# Patient Record
Sex: MALE | Race: WHITE | Employment: FULL TIME | URBAN - METROPOLITAN AREA
[De-identification: names, ages, dates, MRNs, and addresses within clinical notes are randomized per-mention and may not be internally consistent; named-entity substitution may affect disease eponyms.]

---

## 2019-07-27 ENCOUNTER — HOSPITAL ENCOUNTER (EMERGENCY)
Facility: HOSPITAL | Age: 34
Discharge: HOME | End: 2019-07-27
Attending: EMERGENCY MEDICINE
Payer: MEDICAID

## 2019-07-27 ENCOUNTER — APPOINTMENT (OUTPATIENT)
Dept: CT IMAGING | Facility: HOSPITAL | Age: 34
End: 2019-07-27
Attending: EMERGENCY MEDICINE
Payer: MEDICAID

## 2019-07-27 VITALS
BODY MASS INDEX: 21.7 KG/M2 | RESPIRATION RATE: 16 BRPM | HEIGHT: 71 IN | HEART RATE: 65 BPM | TEMPERATURE: 98 F | OXYGEN SATURATION: 100 % | SYSTOLIC BLOOD PRESSURE: 105 MMHG | WEIGHT: 155 LBS | DIASTOLIC BLOOD PRESSURE: 74 MMHG

## 2019-07-27 DIAGNOSIS — R10.31 ABDOMINAL PAIN, RIGHT LOWER QUADRANT: Primary | ICD-10-CM

## 2019-07-27 LAB
ALBUMIN SERPL-MCNC: 4.2 G/DL (ref 3.4–5)
ALBUMIN/GLOB SERPL: 1.2 {RATIO} (ref 1–2)
ALP LIVER SERPL-CCNC: 58 U/L (ref 45–117)
ALT SERPL-CCNC: 19 U/L (ref 16–61)
ANION GAP SERPL CALC-SCNC: 7 MMOL/L (ref 0–18)
AST SERPL-CCNC: 19 U/L (ref 15–37)
BASOPHILS # BLD AUTO: 0.02 X10(3) UL (ref 0–0.2)
BASOPHILS NFR BLD AUTO: 0.2 %
BILIRUB SERPL-MCNC: 0.7 MG/DL (ref 0.1–2)
BILIRUB UR QL STRIP.AUTO: NEGATIVE
BUN BLD-MCNC: 12 MG/DL (ref 7–18)
BUN/CREAT SERPL: 11.7 (ref 10–20)
CALCIUM BLD-MCNC: 9.1 MG/DL (ref 8.5–10.1)
CHLORIDE SERPL-SCNC: 108 MMOL/L (ref 98–112)
CLARITY UR REFRACT.AUTO: CLEAR
CO2 SERPL-SCNC: 25 MMOL/L (ref 21–32)
COLOR UR AUTO: YELLOW
CREAT BLD-MCNC: 1.03 MG/DL (ref 0.7–1.3)
DEPRECATED RDW RBC AUTO: 40.8 FL (ref 35.1–46.3)
EOSINOPHIL # BLD AUTO: 0.2 X10(3) UL (ref 0–0.7)
EOSINOPHIL NFR BLD AUTO: 2.3 %
ERYTHROCYTE [DISTWIDTH] IN BLOOD BY AUTOMATED COUNT: 12 % (ref 11–15)
GLOBULIN PLAS-MCNC: 3.6 G/DL (ref 2.8–4.4)
GLUCOSE BLD-MCNC: 85 MG/DL (ref 70–99)
GLUCOSE UR STRIP.AUTO-MCNC: NEGATIVE MG/DL
HCT VFR BLD AUTO: 40.6 % (ref 39–53)
HGB BLD-MCNC: 14.1 G/DL (ref 13–17.5)
IMM GRANULOCYTES # BLD AUTO: 0.01 X10(3) UL (ref 0–1)
IMM GRANULOCYTES NFR BLD: 0.1 %
KETONES UR STRIP.AUTO-MCNC: NEGATIVE MG/DL
LEUKOCYTE ESTERASE UR QL STRIP.AUTO: NEGATIVE
LYMPHOCYTES # BLD AUTO: 3.72 X10(3) UL (ref 1–4)
LYMPHOCYTES NFR BLD AUTO: 42.1 %
M PROTEIN MFR SERPL ELPH: 7.8 G/DL (ref 6.4–8.2)
MCH RBC QN AUTO: 32 PG (ref 26–34)
MCHC RBC AUTO-ENTMCNC: 34.7 G/DL (ref 31–37)
MCV RBC AUTO: 92.3 FL (ref 80–100)
MONOCYTES # BLD AUTO: 0.51 X10(3) UL (ref 0.1–1)
MONOCYTES NFR BLD AUTO: 5.8 %
NEUTROPHILS # BLD AUTO: 4.38 X10 (3) UL (ref 1.5–7.7)
NEUTROPHILS # BLD AUTO: 4.38 X10(3) UL (ref 1.5–7.7)
NEUTROPHILS NFR BLD AUTO: 49.5 %
NITRITE UR QL STRIP.AUTO: NEGATIVE
OSMOLALITY SERPL CALC.SUM OF ELEC: 289 MOSM/KG (ref 275–295)
PH UR STRIP.AUTO: 6 [PH] (ref 4.5–8)
PLATELET # BLD AUTO: 170 10(3)UL (ref 150–450)
POTASSIUM SERPL-SCNC: 3.8 MMOL/L (ref 3.5–5.1)
PROT UR STRIP.AUTO-MCNC: NEGATIVE MG/DL
RBC # BLD AUTO: 4.4 X10(6)UL (ref 4.3–5.7)
RBC UR QL AUTO: NEGATIVE
SODIUM SERPL-SCNC: 140 MMOL/L (ref 136–145)
SP GR UR STRIP.AUTO: 1.02 (ref 1–1.03)
UROBILINOGEN UR STRIP.AUTO-MCNC: 2 MG/DL
WBC # BLD AUTO: 8.8 X10(3) UL (ref 4–11)

## 2019-07-27 PROCEDURE — 74176 CT ABD & PELVIS W/O CONTRAST: CPT | Performed by: EMERGENCY MEDICINE

## 2019-07-27 PROCEDURE — 87591 N.GONORRHOEAE DNA AMP PROB: CPT | Performed by: EMERGENCY MEDICINE

## 2019-07-27 PROCEDURE — 85025 COMPLETE CBC W/AUTO DIFF WBC: CPT | Performed by: EMERGENCY MEDICINE

## 2019-07-27 PROCEDURE — 99284 EMERGENCY DEPT VISIT MOD MDM: CPT

## 2019-07-27 PROCEDURE — 87491 CHLMYD TRACH DNA AMP PROBE: CPT | Performed by: EMERGENCY MEDICINE

## 2019-07-27 PROCEDURE — 96361 HYDRATE IV INFUSION ADD-ON: CPT

## 2019-07-27 PROCEDURE — 96374 THER/PROPH/DIAG INJ IV PUSH: CPT

## 2019-07-27 PROCEDURE — 81003 URINALYSIS AUTO W/O SCOPE: CPT | Performed by: EMERGENCY MEDICINE

## 2019-07-27 PROCEDURE — 80053 COMPREHEN METABOLIC PANEL: CPT | Performed by: EMERGENCY MEDICINE

## 2019-07-27 RX ORDER — KETOROLAC TROMETHAMINE 30 MG/ML
15 INJECTION, SOLUTION INTRAMUSCULAR; INTRAVENOUS ONCE
Status: COMPLETED | OUTPATIENT
Start: 2019-07-27 | End: 2019-07-27

## 2019-07-27 RX ORDER — FEXOFENADINE HCL 180 MG/1
180 TABLET ORAL DAILY
COMMUNITY
End: 2021-08-30

## 2019-07-27 NOTE — ED PROVIDER NOTES
Patient Seen in: [facility] Emergency Department    History   Patient presents with:  Abdomen/Flank Pain (GI/)    Stated Complaint: lower abdominal pain x 2 months, getting worse    HPI    27-year-old male presents to the emergency department comp above.    Physical Exam     ED Triage Vitals [07/27/19 1426]   /73   Pulse 74   Resp 18   Temp 98.2 °F (36.8 °C)   Temp src Temporal   SpO2 96 %   O2 Device None (Room air)       Current:/74   Pulse 65   Temp 98.1 °F (36.7 °C)   Resp 16   Ht 18 intra-abdominal or pelvic process noted. 2. No bowel wall thickening or dilatation. 3. Incidental small calcified granuloma and scarring within the right lung base anteriorly, appearing without interval change. Dictated by:  Cristina Pacheco DO on 7/27/2019

## 2019-07-27 NOTE — ED INITIAL ASSESSMENT (HPI)
Pt c/o lower right quadrant pain with nausea x2 months - reports it has been increasing in consistency.  Also reports his urine has been dark and he has been having \"discomfort\" at the tip of his penis

## 2019-07-28 LAB
C TRACH DNA SPEC QL NAA+PROBE: NEGATIVE
N GONORRHOEA DNA SPEC QL NAA+PROBE: NEGATIVE

## 2019-08-22 ENCOUNTER — APPOINTMENT (OUTPATIENT)
Dept: GENERAL RADIOLOGY | Facility: HOSPITAL | Age: 34
End: 2019-08-22
Attending: EMERGENCY MEDICINE
Payer: MEDICAID

## 2019-08-22 ENCOUNTER — HOSPITAL ENCOUNTER (EMERGENCY)
Facility: HOSPITAL | Age: 34
Discharge: HOME | End: 2019-08-22
Attending: EMERGENCY MEDICINE
Payer: MEDICAID

## 2019-08-22 VITALS
BODY MASS INDEX: 21.7 KG/M2 | DIASTOLIC BLOOD PRESSURE: 68 MMHG | TEMPERATURE: 99 F | OXYGEN SATURATION: 99 % | SYSTOLIC BLOOD PRESSURE: 102 MMHG | HEIGHT: 71 IN | WEIGHT: 155 LBS | RESPIRATION RATE: 8 BRPM | HEART RATE: 63 BPM

## 2019-08-22 DIAGNOSIS — R07.89 CHEST PAIN, ATYPICAL: Primary | ICD-10-CM

## 2019-08-22 LAB
ALBUMIN SERPL-MCNC: 4.2 G/DL (ref 3.4–5)
ALBUMIN/GLOB SERPL: 1.3 {RATIO} (ref 1–2)
ALP LIVER SERPL-CCNC: 57 U/L (ref 45–117)
ALT SERPL-CCNC: 16 U/L (ref 16–61)
ANION GAP SERPL CALC-SCNC: 7 MMOL/L (ref 0–18)
AST SERPL-CCNC: 15 U/L (ref 15–37)
BASOPHILS # BLD AUTO: 0.04 X10(3) UL (ref 0–0.2)
BASOPHILS NFR BLD AUTO: 0.5 %
BILIRUB SERPL-MCNC: 0.6 MG/DL (ref 0.1–2)
BUN BLD-MCNC: 13 MG/DL (ref 7–18)
BUN/CREAT SERPL: 14.4 (ref 10–20)
CALCIUM BLD-MCNC: 8.9 MG/DL (ref 8.5–10.1)
CHLORIDE SERPL-SCNC: 107 MMOL/L (ref 98–112)
CO2 SERPL-SCNC: 26 MMOL/L (ref 21–32)
CREAT BLD-MCNC: 0.9 MG/DL (ref 0.7–1.3)
D-DIMER: <0.27 UG/ML FEU (ref ?–0.5)
DEPRECATED RDW RBC AUTO: 41.9 FL (ref 35.1–46.3)
EOSINOPHIL # BLD AUTO: 0.15 X10(3) UL (ref 0–0.7)
EOSINOPHIL NFR BLD AUTO: 1.8 %
ERYTHROCYTE [DISTWIDTH] IN BLOOD BY AUTOMATED COUNT: 12.1 % (ref 11–15)
GLOBULIN PLAS-MCNC: 3.3 G/DL (ref 2.8–4.4)
GLUCOSE BLD-MCNC: 82 MG/DL (ref 70–99)
HCT VFR BLD AUTO: 40.2 % (ref 39–53)
HGB BLD-MCNC: 13.8 G/DL (ref 13–17.5)
IMM GRANULOCYTES # BLD AUTO: 0.02 X10(3) UL (ref 0–1)
IMM GRANULOCYTES NFR BLD: 0.2 %
LYMPHOCYTES # BLD AUTO: 3.27 X10(3) UL (ref 1–4)
LYMPHOCYTES NFR BLD AUTO: 38.8 %
M PROTEIN MFR SERPL ELPH: 7.5 G/DL (ref 6.4–8.2)
MCH RBC QN AUTO: 32.2 PG (ref 26–34)
MCHC RBC AUTO-ENTMCNC: 34.3 G/DL (ref 31–37)
MCV RBC AUTO: 93.7 FL (ref 80–100)
MONOCYTES # BLD AUTO: 0.57 X10(3) UL (ref 0.1–1)
MONOCYTES NFR BLD AUTO: 6.8 %
NEUTROPHILS # BLD AUTO: 4.38 X10 (3) UL (ref 1.5–7.7)
NEUTROPHILS # BLD AUTO: 4.38 X10(3) UL (ref 1.5–7.7)
NEUTROPHILS NFR BLD AUTO: 51.9 %
OSMOLALITY SERPL CALC.SUM OF ELEC: 289 MOSM/KG (ref 275–295)
PLATELET # BLD AUTO: 180 10(3)UL (ref 150–450)
POTASSIUM SERPL-SCNC: 3.8 MMOL/L (ref 3.5–5.1)
RBC # BLD AUTO: 4.29 X10(6)UL (ref 4.3–5.7)
SODIUM SERPL-SCNC: 140 MMOL/L (ref 136–145)
TROPONIN I SERPL-MCNC: <0.045 NG/ML (ref ?–0.04)
WBC # BLD AUTO: 8.4 X10(3) UL (ref 4–11)

## 2019-08-22 PROCEDURE — 93005 ELECTROCARDIOGRAM TRACING: CPT

## 2019-08-22 PROCEDURE — 71045 X-RAY EXAM CHEST 1 VIEW: CPT | Performed by: EMERGENCY MEDICINE

## 2019-08-22 PROCEDURE — 85025 COMPLETE CBC W/AUTO DIFF WBC: CPT | Performed by: EMERGENCY MEDICINE

## 2019-08-22 PROCEDURE — 84484 ASSAY OF TROPONIN QUANT: CPT | Performed by: EMERGENCY MEDICINE

## 2019-08-22 PROCEDURE — 93010 ELECTROCARDIOGRAM REPORT: CPT

## 2019-08-22 PROCEDURE — 99284 EMERGENCY DEPT VISIT MOD MDM: CPT

## 2019-08-22 PROCEDURE — 80053 COMPREHEN METABOLIC PANEL: CPT | Performed by: EMERGENCY MEDICINE

## 2019-08-22 PROCEDURE — 85379 FIBRIN DEGRADATION QUANT: CPT | Performed by: EMERGENCY MEDICINE

## 2019-08-22 PROCEDURE — 99285 EMERGENCY DEPT VISIT HI MDM: CPT

## 2019-08-22 PROCEDURE — 36415 COLL VENOUS BLD VENIPUNCTURE: CPT

## 2019-08-22 RX ORDER — HYDROCODONE BITARTRATE AND ACETAMINOPHEN 5; 325 MG/1; MG/1
1-2 TABLET ORAL EVERY 6 HOURS PRN
Qty: 10 TABLET | Refills: 0 | Status: SHIPPED | OUTPATIENT
Start: 2019-08-22 | End: 2019-08-29

## 2019-08-22 NOTE — ED PROVIDER NOTES
Patient Seen in: [facility] Emergency Department    History   Patient presents with:  Chest Pain Angina (cardiovascular)    Stated Complaint:     HPI    Patient is a 14-year-old male who reports he has a history of myocardial bridging with a \"70% b BMI 21.62 kg/m²         Physical Exam   Constitutional: He is oriented to person, place, and time. He appears well-developed and well-nourished. HENT:   Head: Normocephalic and atraumatic.    Mouth/Throat: Oropharynx is clear and moist.   Eyes: Pupils ar noted on EKG Report. Rate: 78  Rhythm: Sinus Rhythm  Reading: No ST segment or T wave changes. No old available for comparison. Ct Abdomen+pelvis Kidneystone 2d Rndr(no Iv,no Oral)(cpt=74176)    Result Date: 7/27/2019  CONCLUSION:  1.  No acu Bryanna Alea 6.  [phone]    In 3 days          Medications Prescribed:  Current Discharge Medication List    START taking these medications    HYDROcodone-acetaminophen 5-325 MG Oral Tab  Take 1-2 tablets by mouth every 6 (six) hours as neede

## 2019-08-22 NOTE — ED INITIAL ASSESSMENT (HPI)
PT c/o chest pain starting around 0600, woke PT out of sleep. PT states he \"felt like I was gonna pass out while on the highway\" yesterday and Sunday. PT c/o SOB with chest pain. Also c/o leg and foot pain.

## 2019-08-23 LAB
ATRIAL RATE: 78 BPM
P AXIS: 69 DEGREES
P-R INTERVAL: 142 MS
Q-T INTERVAL: 376 MS
QRS DURATION: 78 MS
QTC CALCULATION (BEZET): 428 MS
R AXIS: 12 DEGREES
T AXIS: 56 DEGREES
VENTRICULAR RATE: 78 BPM

## 2019-09-04 ENCOUNTER — HOSPITAL ENCOUNTER (EMERGENCY)
Facility: HOSPITAL | Age: 34
Discharge: HOME | End: 2019-09-04
Attending: EMERGENCY MEDICINE
Payer: MEDICAID

## 2019-09-04 VITALS
OXYGEN SATURATION: 98 % | HEART RATE: 87 BPM | RESPIRATION RATE: 20 BRPM | TEMPERATURE: 99 F | DIASTOLIC BLOOD PRESSURE: 70 MMHG | SYSTOLIC BLOOD PRESSURE: 119 MMHG

## 2019-09-04 DIAGNOSIS — K04.7 DENTAL INFECTION: Primary | ICD-10-CM

## 2019-09-04 LAB
ANION GAP SERPL CALC-SCNC: 9 MMOL/L (ref 0–18)
BASOPHILS # BLD AUTO: 0.02 X10(3) UL (ref 0–0.2)
BASOPHILS NFR BLD AUTO: 0.2 %
BUN BLD-MCNC: 6 MG/DL (ref 7–18)
BUN/CREAT SERPL: 7.5 (ref 10–20)
CALCIUM BLD-MCNC: 8.8 MG/DL (ref 8.5–10.1)
CHLORIDE SERPL-SCNC: 105 MMOL/L (ref 98–112)
CO2 SERPL-SCNC: 25 MMOL/L (ref 21–32)
CREAT BLD-MCNC: 0.8 MG/DL (ref 0.7–1.3)
DEPRECATED RDW RBC AUTO: 41.2 FL (ref 35.1–46.3)
EOSINOPHIL # BLD AUTO: 0.12 X10(3) UL (ref 0–0.7)
EOSINOPHIL NFR BLD AUTO: 1.1 %
ERYTHROCYTE [DISTWIDTH] IN BLOOD BY AUTOMATED COUNT: 11.9 % (ref 11–15)
GLUCOSE BLD-MCNC: 89 MG/DL (ref 70–99)
HCT VFR BLD AUTO: 40 % (ref 39–53)
HGB BLD-MCNC: 13.4 G/DL (ref 13–17.5)
IMM GRANULOCYTES # BLD AUTO: 0.05 X10(3) UL (ref 0–1)
IMM GRANULOCYTES NFR BLD: 0.5 %
LYMPHOCYTES # BLD AUTO: 2.18 X10(3) UL (ref 1–4)
LYMPHOCYTES NFR BLD AUTO: 19.9 %
MCH RBC QN AUTO: 31.6 PG (ref 26–34)
MCHC RBC AUTO-ENTMCNC: 33.5 G/DL (ref 31–37)
MCV RBC AUTO: 94.3 FL (ref 80–100)
MONOCYTES # BLD AUTO: 0.74 X10(3) UL (ref 0.1–1)
MONOCYTES NFR BLD AUTO: 6.8 %
NEUTROPHILS # BLD AUTO: 7.84 X10 (3) UL (ref 1.5–7.7)
NEUTROPHILS # BLD AUTO: 7.84 X10(3) UL (ref 1.5–7.7)
NEUTROPHILS NFR BLD AUTO: 71.5 %
OSMOLALITY SERPL CALC.SUM OF ELEC: 285 MOSM/KG (ref 275–295)
PLATELET # BLD AUTO: 159 10(3)UL (ref 150–450)
POTASSIUM SERPL-SCNC: 3.6 MMOL/L (ref 3.5–5.1)
RBC # BLD AUTO: 4.24 X10(6)UL (ref 4.3–5.7)
SODIUM SERPL-SCNC: 139 MMOL/L (ref 136–145)
WBC # BLD AUTO: 11 X10(3) UL (ref 4–11)

## 2019-09-04 PROCEDURE — 99284 EMERGENCY DEPT VISIT MOD MDM: CPT

## 2019-09-04 PROCEDURE — 96365 THER/PROPH/DIAG IV INF INIT: CPT

## 2019-09-04 PROCEDURE — 85025 COMPLETE CBC W/AUTO DIFF WBC: CPT | Performed by: PHYSICIAN ASSISTANT

## 2019-09-04 PROCEDURE — 80048 BASIC METABOLIC PNL TOTAL CA: CPT | Performed by: PHYSICIAN ASSISTANT

## 2019-09-04 PROCEDURE — 96375 TX/PRO/DX INJ NEW DRUG ADDON: CPT

## 2019-09-04 RX ORDER — CLINDAMYCIN HYDROCHLORIDE 300 MG/1
600 CAPSULE ORAL 3 TIMES DAILY
Qty: 60 CAPSULE | Refills: 0 | Status: SHIPPED | OUTPATIENT
Start: 2019-09-04 | End: 2019-09-14

## 2019-09-04 RX ORDER — HYDROCODONE BITARTRATE AND ACETAMINOPHEN 5; 325 MG/1; MG/1
1 TABLET ORAL EVERY 6 HOURS PRN
Qty: 10 TABLET | Refills: 0 | Status: SHIPPED | OUTPATIENT
Start: 2019-09-04 | End: 2019-09-04

## 2019-09-04 RX ORDER — HYDROCODONE BITARTRATE AND ACETAMINOPHEN 5; 325 MG/1; MG/1
1 TABLET ORAL EVERY 6 HOURS PRN
Qty: 10 TABLET | Refills: 0 | Status: SHIPPED | OUTPATIENT
Start: 2019-09-04 | End: 2019-09-11

## 2019-09-04 RX ORDER — CLINDAMYCIN HYDROCHLORIDE 150 MG/1
CAPSULE ORAL 3 TIMES DAILY
COMMUNITY
End: 2021-08-30

## 2019-09-04 RX ORDER — KETOROLAC TROMETHAMINE 30 MG/ML
30 INJECTION, SOLUTION INTRAMUSCULAR; INTRAVENOUS ONCE
Status: COMPLETED | OUTPATIENT
Start: 2019-09-04 | End: 2019-09-04

## 2019-09-04 NOTE — ED INITIAL ASSESSMENT (HPI)
Pt here with right sided mouth pain. Pt started clindamycin, total 4 doses since yesterday. Pt not feeling better.

## 2019-09-04 NOTE — ED PROVIDER NOTES
Patient Seen in: [facility] Emergency Department    History   Patient presents with:  Dental Problem (dental)    Stated Complaint: dental problem, seen by dentist yesterday    HPI    Patient is a 14-year-old male.   Medical history of anxiety, myocar mandibular line swelling. Moderate right periodontal inflammation with minimal fluctuance. Very poor dentition. Multiple caries. Anterior neck is spared.   Lung: No distress, RR, no retraction, breath sounds are clear bilaterally  Cardio: Sinus tachycar Short course of pain management. Vital signs improved. Pulse of 87. Patient feels much improved. Discharge.       Disposition and Plan     Clinical Impression:  Dental infection  (primary encounter diagnosis)    Disposition:  There is no disposition o

## 2019-09-04 NOTE — ED NOTES
Patient reports he was seen by a dental care clinic for right dental pain yesterday. Was told he had an infection, started on antibiotics yesterday and today the swelling and pain is now worse.   Feels like the swelling is now under his jaw and in his thro

## 2020-01-01 ENCOUNTER — EXTERNAL RECORD (OUTPATIENT)
Dept: CARDIOLOGY | Age: 35
End: 2020-01-01

## 2020-04-28 ENCOUNTER — APPOINTMENT (OUTPATIENT)
Dept: GENERAL RADIOLOGY | Facility: HOSPITAL | Age: 35
End: 2020-04-28
Attending: NURSE PRACTITIONER
Payer: MEDICAID

## 2020-04-28 ENCOUNTER — HOSPITAL ENCOUNTER (EMERGENCY)
Facility: HOSPITAL | Age: 35
Discharge: HOME | End: 2020-04-28
Attending: EMERGENCY MEDICINE
Payer: MEDICAID

## 2020-04-28 VITALS
BODY MASS INDEX: 21.7 KG/M2 | SYSTOLIC BLOOD PRESSURE: 118 MMHG | TEMPERATURE: 99 F | OXYGEN SATURATION: 98 % | HEART RATE: 94 BPM | HEIGHT: 71 IN | RESPIRATION RATE: 16 BRPM | DIASTOLIC BLOOD PRESSURE: 75 MMHG | WEIGHT: 155 LBS

## 2020-04-28 DIAGNOSIS — B34.9 VIRAL SYNDROME: Primary | ICD-10-CM

## 2020-04-28 PROCEDURE — 71045 X-RAY EXAM CHEST 1 VIEW: CPT | Performed by: NURSE PRACTITIONER

## 2020-04-28 PROCEDURE — 99283 EMERGENCY DEPT VISIT LOW MDM: CPT

## 2020-04-28 NOTE — ED PROVIDER NOTES
Patient Seen in: [facility] Emergency Department      History   Patient presents with:  Fever  Cough/URI    Stated Complaint: fever/chills/body aches ~ (101.7tmax)     39-year-old male presents today with complaints of fever, body aches joint pain a Temp 98.6 °F (37 °C) (Oral)   Resp 16   Ht 180.3 cm (5' 11\")   Wt 70.3 kg   SpO2 98%   BMI 21.62 kg/m²         Physical Exam  Vitals signs and nursing note reviewed. Constitutional:       Appearance: He is well-developed.    HENT:      Head: Normocephali with a slight cough any URI symptoms. Patient also complaining of headache. Symptom started last night. Did take TheraFlu which he did feel better. Was afebrile upon arrival here. Patient without any respiratory distress. Rapid COVID was negative.   Jan Delcid

## 2020-04-28 NOTE — ED INITIAL ASSESSMENT (HPI)
Pt is ambulatory to room, reports symptoms started last night with fever tmax 101.7, chills, bodyaches, dry cough, and headache. 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 Last took Theraflu at 10pm last night.

## 2020-07-14 ENCOUNTER — TELEPHONE (OUTPATIENT)
Dept: CARDIOLOGY | Age: 35
End: 2020-07-14

## 2020-07-21 ENCOUNTER — TELEPHONE (OUTPATIENT)
Dept: CARDIOLOGY | Age: 35
End: 2020-07-21

## 2020-07-21 ENCOUNTER — OFFICE VISIT (OUTPATIENT)
Dept: CARDIOLOGY | Age: 35
End: 2020-07-21

## 2020-07-21 VITALS
WEIGHT: 151 LBS | BODY MASS INDEX: 21.14 KG/M2 | HEART RATE: 64 BPM | DIASTOLIC BLOOD PRESSURE: 58 MMHG | SYSTOLIC BLOOD PRESSURE: 110 MMHG | HEIGHT: 71 IN

## 2020-07-21 DIAGNOSIS — R06.02 SOB (SHORTNESS OF BREATH): ICD-10-CM

## 2020-07-21 DIAGNOSIS — R00.2 PALPITATIONS: ICD-10-CM

## 2020-07-21 DIAGNOSIS — R07.9 CHEST PAIN, UNSPECIFIED TYPE: Primary | ICD-10-CM

## 2020-07-21 DIAGNOSIS — Q24.5 MYOCARDIAL BRIDGE: ICD-10-CM

## 2020-07-21 PROCEDURE — 99245 OFF/OP CONSLTJ NEW/EST HI 55: CPT | Performed by: INTERNAL MEDICINE

## 2020-07-21 ASSESSMENT — PATIENT HEALTH QUESTIONNAIRE - PHQ9
CLINICAL INTERPRETATION OF PHQ2 SCORE: NO FURTHER SCREENING NEEDED
CLINICAL INTERPRETATION OF PHQ9 SCORE: NO FURTHER SCREENING NEEDED
1. LITTLE INTEREST OR PLEASURE IN DOING THINGS: NOT AT ALL
SUM OF ALL RESPONSES TO PHQ9 QUESTIONS 1 AND 2: 0
2. FEELING DOWN, DEPRESSED OR HOPELESS: NOT AT ALL
SUM OF ALL RESPONSES TO PHQ9 QUESTIONS 1 AND 2: 0

## 2020-07-21 ASSESSMENT — ENCOUNTER SYMPTOMS
FEVER: 0
COUGH: 0
HEMATOCHEZIA: 0
ALLERGIC/IMMUNOLOGIC COMMENTS: NO NEW FOOD ALLERGIES
BRUISES/BLEEDS EASILY: 0
HEMOPTYSIS: 0
WEIGHT GAIN: 0
WEIGHT LOSS: 0
CHILLS: 0
SUSPICIOUS LESIONS: 0

## 2020-07-24 ENCOUNTER — ORDER TRANSCRIPTION (OUTPATIENT)
Dept: ADMINISTRATIVE | Facility: HOSPITAL | Age: 35
End: 2020-07-24

## 2020-07-24 DIAGNOSIS — R06.02 SOB (SHORTNESS OF BREATH): Primary | ICD-10-CM

## 2020-07-24 DIAGNOSIS — R07.9 CHEST PAIN, UNSPECIFIED TYPE: ICD-10-CM

## 2020-11-13 ENCOUNTER — HOSPITAL ENCOUNTER (EMERGENCY)
Facility: HOSPITAL | Age: 35
Discharge: HOME | End: 2020-11-13
Attending: EMERGENCY MEDICINE
Payer: MEDICAID

## 2020-11-13 VITALS
HEART RATE: 84 BPM | TEMPERATURE: 98 F | HEIGHT: 71 IN | SYSTOLIC BLOOD PRESSURE: 115 MMHG | DIASTOLIC BLOOD PRESSURE: 73 MMHG | WEIGHT: 160 LBS | BODY MASS INDEX: 22.4 KG/M2 | RESPIRATION RATE: 16 BRPM | OXYGEN SATURATION: 99 %

## 2020-11-13 DIAGNOSIS — B34.9 VIRAL SYNDROME: Primary | ICD-10-CM

## 2020-11-13 DIAGNOSIS — Z20.822 CLOSE EXPOSURE TO COVID-19 VIRUS: ICD-10-CM

## 2020-11-13 PROCEDURE — 87081 CULTURE SCREEN ONLY: CPT | Performed by: EMERGENCY MEDICINE

## 2020-11-13 PROCEDURE — 87147 CULTURE TYPE IMMUNOLOGIC: CPT | Performed by: EMERGENCY MEDICINE

## 2020-11-13 PROCEDURE — 87430 STREP A AG IA: CPT | Performed by: EMERGENCY MEDICINE

## 2020-11-13 PROCEDURE — 99283 EMERGENCY DEPT VISIT LOW MDM: CPT

## 2020-11-14 NOTE — ED PROVIDER NOTES
Patient Seen in: [facility] Emergency Department      History   Patient presents with:  Sore Throat  Cough/URI    Stated Complaint: sore throat, slight sob, close friend covid +    HPI    Patient is a 48-year-old who has close exposure to someone wi extremities. Normal capillary refill. SKIN: Well perfused, without cyanosis. No rashes. NEURO: Alert and appropriate with no focal neurologic deficits.     ED Course     Labs Reviewed   RAPID STREP A SCREEN (LC) - Normal   SARS-COV-2 BY PCR ()   GR

## 2020-11-23 ENCOUNTER — TELEPHONE (OUTPATIENT)
Dept: CARDIOLOGY | Age: 35
End: 2020-11-23

## 2021-09-25 ENCOUNTER — APPOINTMENT (OUTPATIENT)
Dept: CT IMAGING | Age: 36
End: 2021-09-25
Attending: EMERGENCY MEDICINE
Payer: MEDICAID

## 2021-09-25 ENCOUNTER — HOSPITAL ENCOUNTER (EMERGENCY)
Age: 36
Discharge: HOME | End: 2021-09-26
Attending: EMERGENCY MEDICINE
Payer: MEDICAID

## 2021-09-25 VITALS
WEIGHT: 139 LBS | HEART RATE: 67 BPM | BODY MASS INDEX: 19.46 KG/M2 | DIASTOLIC BLOOD PRESSURE: 79 MMHG | OXYGEN SATURATION: 98 % | SYSTOLIC BLOOD PRESSURE: 106 MMHG | HEIGHT: 71 IN | RESPIRATION RATE: 19 BRPM

## 2021-09-25 DIAGNOSIS — R63.0 ANOREXIA: ICD-10-CM

## 2021-09-25 DIAGNOSIS — R07.89 CHEST PAIN, ATYPICAL: ICD-10-CM

## 2021-09-25 DIAGNOSIS — R00.2 PALPITATIONS: ICD-10-CM

## 2021-09-25 DIAGNOSIS — R53.83 OTHER FATIGUE: Primary | ICD-10-CM

## 2021-09-25 LAB
ALBUMIN SERPL-MCNC: 4 G/DL (ref 3.4–5)
ALBUMIN/GLOB SERPL: 1.2 {RATIO} (ref 1–2)
ALP LIVER SERPL-CCNC: 49 U/L
ALT SERPL-CCNC: 21 U/L
ANION GAP SERPL CALC-SCNC: 5 MMOL/L (ref 0–18)
AST SERPL-CCNC: 13 U/L (ref 15–37)
BASOPHILS # BLD AUTO: 0.04 X10(3) UL (ref 0–0.2)
BASOPHILS NFR BLD AUTO: 0.5 %
BILIRUB SERPL-MCNC: 0.8 MG/DL (ref 0.1–2)
BUN BLD-MCNC: 10 MG/DL (ref 7–18)
CALCIUM BLD-MCNC: 9.3 MG/DL (ref 8.5–10.1)
CHLORIDE SERPL-SCNC: 104 MMOL/L (ref 98–112)
CK SERPL-CCNC: 96 U/L
CO2 SERPL-SCNC: 30 MMOL/L (ref 21–32)
CREAT BLD-MCNC: 0.98 MG/DL
EOSINOPHIL # BLD AUTO: 0.19 X10(3) UL (ref 0–0.7)
EOSINOPHIL NFR BLD AUTO: 2.3 %
ERYTHROCYTE [DISTWIDTH] IN BLOOD BY AUTOMATED COUNT: 12.1 %
GLOBULIN PLAS-MCNC: 3.3 G/DL (ref 2.8–4.4)
GLUCOSE BLD-MCNC: 97 MG/DL (ref 70–99)
HCT VFR BLD AUTO: 42.7 %
HGB BLD-MCNC: 14.3 G/DL
IMM GRANULOCYTES # BLD AUTO: 0.01 X10(3) UL (ref 0–1)
IMM GRANULOCYTES NFR BLD: 0.1 %
LIPASE SERPL-CCNC: 82 U/L (ref 73–393)
LYMPHOCYTES # BLD AUTO: 3.12 X10(3) UL (ref 1–4)
LYMPHOCYTES NFR BLD AUTO: 37 %
MCH RBC QN AUTO: 32.4 PG (ref 26–34)
MCHC RBC AUTO-ENTMCNC: 33.5 G/DL (ref 31–37)
MCV RBC AUTO: 96.6 FL
MONOCYTES # BLD AUTO: 0.55 X10(3) UL (ref 0.1–1)
MONOCYTES NFR BLD AUTO: 6.5 %
NEUTROPHILS # BLD AUTO: 4.52 X10 (3) UL (ref 1.5–7.7)
NEUTROPHILS # BLD AUTO: 4.52 X10(3) UL (ref 1.5–7.7)
NEUTROPHILS NFR BLD AUTO: 53.6 %
OSMOLALITY SERPL CALC.SUM OF ELEC: 287 MOSM/KG (ref 275–295)
PLATELET # BLD AUTO: 174 10(3)UL (ref 150–450)
POTASSIUM SERPL-SCNC: 3.8 MMOL/L (ref 3.5–5.1)
PROT SERPL-MCNC: 7.3 G/DL (ref 6.4–8.2)
RBC # BLD AUTO: 4.42 X10(6)UL
SODIUM SERPL-SCNC: 139 MMOL/L (ref 136–145)
TROPONIN I SERPL-MCNC: <0.045 NG/ML (ref ?–0.04)
WBC # BLD AUTO: 8.4 X10(3) UL (ref 4–11)

## 2021-09-25 PROCEDURE — 99285 EMERGENCY DEPT VISIT HI MDM: CPT

## 2021-09-25 PROCEDURE — 80053 COMPREHEN METABOLIC PANEL: CPT | Performed by: EMERGENCY MEDICINE

## 2021-09-25 PROCEDURE — 84484 ASSAY OF TROPONIN QUANT: CPT | Performed by: EMERGENCY MEDICINE

## 2021-09-25 PROCEDURE — 83690 ASSAY OF LIPASE: CPT

## 2021-09-25 PROCEDURE — 85025 COMPLETE CBC W/AUTO DIFF WBC: CPT

## 2021-09-25 PROCEDURE — 93005 ELECTROCARDIOGRAM TRACING: CPT

## 2021-09-25 PROCEDURE — 74177 CT ABD & PELVIS W/CONTRAST: CPT | Performed by: EMERGENCY MEDICINE

## 2021-09-25 PROCEDURE — 85025 COMPLETE CBC W/AUTO DIFF WBC: CPT | Performed by: EMERGENCY MEDICINE

## 2021-09-25 PROCEDURE — 84484 ASSAY OF TROPONIN QUANT: CPT

## 2021-09-25 PROCEDURE — 83690 ASSAY OF LIPASE: CPT | Performed by: EMERGENCY MEDICINE

## 2021-09-25 PROCEDURE — 80053 COMPREHEN METABOLIC PANEL: CPT

## 2021-09-25 PROCEDURE — 82550 ASSAY OF CK (CPK): CPT | Performed by: EMERGENCY MEDICINE

## 2021-09-25 PROCEDURE — 70450 CT HEAD/BRAIN W/O DYE: CPT | Performed by: EMERGENCY MEDICINE

## 2021-09-25 PROCEDURE — 71275 CT ANGIOGRAPHY CHEST: CPT | Performed by: EMERGENCY MEDICINE

## 2021-09-25 PROCEDURE — 96360 HYDRATION IV INFUSION INIT: CPT

## 2021-09-25 PROCEDURE — 99284 EMERGENCY DEPT VISIT MOD MDM: CPT

## 2021-09-25 PROCEDURE — 84443 ASSAY THYROID STIM HORMONE: CPT | Performed by: EMERGENCY MEDICINE

## 2021-09-25 PROCEDURE — 93010 ELECTROCARDIOGRAM REPORT: CPT

## 2021-09-26 LAB
ATRIAL RATE: 78 BPM
P AXIS: 19 DEGREES
P-R INTERVAL: 144 MS
Q-T INTERVAL: 380 MS
QRS DURATION: 82 MS
QTC CALCULATION (BEZET): 433 MS
R AXIS: 33 DEGREES
T AXIS: 57 DEGREES
TSI SER-ACNC: 0.91 MIU/ML (ref 0.36–3.74)
VENTRICULAR RATE: 78 BPM

## 2021-09-26 NOTE — ED INITIAL ASSESSMENT (HPI)
For past 2 weeks has had pressure in head, non productive cough, unintentional weight loss of 20 lbs in past few months, pressure in chest. Decreased appetite. Nausea but no emesis.  Reports occasional palpitations, increased fatigue and some SOB with exert

## 2021-09-26 NOTE — ED PROVIDER NOTES
Patient Seen in: THE [facility] Emergency Department In [place]      History   Patient presents with:  Difficulty Breathing  Abdomen/Flank Pain    Stated Complaint: some SOB;cough;left lower rib and LUQ abd pain for 3 wks    Subjective:   HPI    Patient for m are normal. Pupils are equal, round, and reactive to light. Neck: Normal range of motion. Neck supple. No JVD present. Cardiovascular: Normal rate and regular rhythm. Normal peripheral perfusion with good color.   No murmurs  Pulmonary/Chest: Normal reported anorexia and some left upper quadrant pain as well. I have personally visualized the Radiology studies and agree with interpretation from radiology.     CT BRAIN OR HEAD ([id number])    Result Date: 9/25/2021  PROCEDURE:  CT BRAIN OR HEAD (28152)  CO visualization of vascular anatomy. Dose reduction techniques were used. Dose information is transmitted to the ACR [facility] [employer] [facility]iology) NRDR ([address]) which includes the Dose Index Registry.   PATIENT STATED HISTORY:(As No free fluid. Copious amount stool throughout the colon. Dictated by (CST): Sunday Castanon MD on 9/25/2021 at 11:30 PM     Finalized by (CST): Sunday Castanon MD on 9/25/2021 at 11:44 PM         Basic labs are fine. Troponin negative. TSH is pending.

## 2021-10-22 ENCOUNTER — HOSPITAL ENCOUNTER (EMERGENCY)
Age: 36
Discharge: HOME | End: 2021-10-22
Attending: EMERGENCY MEDICINE
Payer: MEDICAID

## 2021-10-22 VITALS
DIASTOLIC BLOOD PRESSURE: 65 MMHG | OXYGEN SATURATION: 97 % | BODY MASS INDEX: 19.6 KG/M2 | HEIGHT: 71 IN | RESPIRATION RATE: 20 BRPM | WEIGHT: 140 LBS | SYSTOLIC BLOOD PRESSURE: 117 MMHG | TEMPERATURE: 98 F | HEART RATE: 82 BPM

## 2021-10-22 DIAGNOSIS — R19.7 NAUSEA VOMITING AND DIARRHEA: Primary | ICD-10-CM

## 2021-10-22 DIAGNOSIS — R11.2 NAUSEA VOMITING AND DIARRHEA: Primary | ICD-10-CM

## 2021-10-22 DIAGNOSIS — R10.9 ABDOMINAL PAIN OF UNKNOWN ETIOLOGY: ICD-10-CM

## 2021-10-22 PROCEDURE — 99283 EMERGENCY DEPT VISIT LOW MDM: CPT

## 2021-10-22 RX ORDER — DICYCLOMINE HCL 20 MG
20 TABLET ORAL 4 TIMES DAILY PRN
Qty: 30 TABLET | Refills: 0 | Status: SHIPPED | OUTPATIENT
Start: 2021-10-22 | End: 2021-11-21

## 2021-10-22 RX ORDER — ONDANSETRON 4 MG/1
4 TABLET, ORALLY DISINTEGRATING ORAL ONCE
Status: COMPLETED | OUTPATIENT
Start: 2021-10-22 | End: 2021-10-22

## 2021-10-22 RX ORDER — ONDANSETRON 4 MG/1
2 TABLET, ORALLY DISINTEGRATING ORAL EVERY 4 HOURS PRN
Qty: 10 TABLET | Refills: 0 | Status: SHIPPED | OUTPATIENT
Start: 2021-10-22 | End: 2021-10-29

## 2021-10-22 NOTE — ED PROVIDER NOTES
Patient Seen in: THE [facility] Emergency Department In [place]      History   Patient presents with:  Abdomen/Flank Pain    Stated Complaint: abd pain nvd    Subjective:   HPI    This is a 69-year-old male who states that him and his family members all have src Temporal   SpO2 98 %   O2 Device None (Room air)       Current:/65   Pulse 82   Temp 98.3 °F (36.8 °C) (Temporal)   Resp 20   Ht 180.3 cm (5' 11\")   Wt 63.5 kg   SpO2 97%   BMI 19.53 kg/m²         Physical Exam  General: Well-developed male no r Impression:  Nausea vomiting and diarrhea  (primary encounter diagnosis)  Abdominal pain of unknown etiology     Disposition:  Discharge  10/22/2021  7:13 pm    Follow-up:  Kaila De La Torre MD  [address]  [phone]

## 2021-11-21 ENCOUNTER — HOSPITAL ENCOUNTER (EMERGENCY)
Age: 36
Discharge: HOME | End: 2021-11-21
Payer: MEDICAID

## 2021-11-21 VITALS
TEMPERATURE: 98 F | WEIGHT: 140 LBS | DIASTOLIC BLOOD PRESSURE: 65 MMHG | BODY MASS INDEX: 20.73 KG/M2 | RESPIRATION RATE: 16 BRPM | HEIGHT: 69 IN | HEART RATE: 75 BPM | SYSTOLIC BLOOD PRESSURE: 115 MMHG | OXYGEN SATURATION: 98 %

## 2021-11-21 DIAGNOSIS — J06.9 VIRAL UPPER RESPIRATORY ILLNESS: Primary | ICD-10-CM

## 2021-11-21 PROCEDURE — 99283 EMERGENCY DEPT VISIT LOW MDM: CPT

## 2021-11-22 NOTE — ED PROVIDER NOTES
Patient Seen in: THE [facility] Emergency Department In [place]      History   Patient presents with:  Testing  Headache    Stated Complaint: Wife just tested positive for COVID so he requests testing.  He c/o a slight hea*    Subjective:   HPI    23-year-old motion, no thyromegaly or lymphadenopathy.   Eye examination: EOMs are intact, normal conjunctival  ENT: Low-grade audible nasal congestion  Lung: No distress, RR, no retraction, subtle expiratory wheeze to the left lower lobe  Cardio: Regular rate and rhyt

## 2021-11-30 ENCOUNTER — HOSPITAL ENCOUNTER (EMERGENCY)
Age: 36
Discharge: HOME | End: 2021-11-30
Payer: MEDICAID

## 2021-11-30 ENCOUNTER — APPOINTMENT (OUTPATIENT)
Dept: GENERAL RADIOLOGY | Age: 36
End: 2021-11-30
Payer: MEDICAID

## 2021-11-30 VITALS
SYSTOLIC BLOOD PRESSURE: 126 MMHG | WEIGHT: 145 LBS | HEIGHT: 71 IN | OXYGEN SATURATION: 98 % | HEART RATE: 83 BPM | RESPIRATION RATE: 20 BRPM | DIASTOLIC BLOOD PRESSURE: 70 MMHG | BODY MASS INDEX: 20.3 KG/M2 | TEMPERATURE: 99 F

## 2021-11-30 DIAGNOSIS — R05.9 COUGH: Primary | ICD-10-CM

## 2021-11-30 PROCEDURE — 99284 EMERGENCY DEPT VISIT MOD MDM: CPT | Performed by: EMERGENCY MEDICINE

## 2021-11-30 PROCEDURE — 71046 X-RAY EXAM CHEST 2 VIEWS: CPT

## 2021-11-30 RX ORDER — AMOXICILLIN AND CLAVULANATE POTASSIUM 875; 125 MG/1; MG/1
1 TABLET, FILM COATED ORAL 2 TIMES DAILY
Qty: 20 TABLET | Refills: 0 | Status: SHIPPED | OUTPATIENT
Start: 2021-11-30 | End: 2021-12-10

## 2021-11-30 RX ORDER — ALBUTEROL SULFATE 90 UG/1
2 AEROSOL, METERED RESPIRATORY (INHALATION) EVERY 4 HOURS PRN
Qty: 1 EACH | Refills: 0 | Status: SHIPPED | OUTPATIENT
Start: 2021-11-30 | End: 2021-12-30

## 2021-12-01 NOTE — ED INITIAL ASSESSMENT (HPI)
Pt reports cough, sore throat, low grade fever, chest congestion for 2.5 wks was seen here 2 sundays ago dx with URI. Pt states he feels worse now.

## 2021-12-01 NOTE — ED PROVIDER NOTES
Patient Seen in: THE [facility] Emergency Department In [place]      History   Patient presents with:  Cough/URI    Stated Complaint: sore throat, cough, chest congestion, fever 2 wks ago was dx with uri here    Subjective:   HPI    65-year-old male.   History Physical Exam    Gen: Well appearing, well groomed, alert and aware x 3  Neck: Supple, full range of motion, no thyromegaly or lymphadenopathy.   Eye examination: EOMs are intact, normal conjunctival  ENT: No injection noted to the bilateral

## 2022-03-28 ENCOUNTER — HOSPITAL ENCOUNTER (EMERGENCY)
Age: 37
Discharge: HOME | End: 2022-03-28
Attending: STUDENT IN AN ORGANIZED HEALTH CARE EDUCATION/TRAINING PROGRAM
Payer: MEDICAID

## 2022-03-28 VITALS
HEIGHT: 71 IN | OXYGEN SATURATION: 100 % | TEMPERATURE: 99 F | WEIGHT: 148 LBS | BODY MASS INDEX: 20.72 KG/M2 | HEART RATE: 97 BPM | SYSTOLIC BLOOD PRESSURE: 149 MMHG | RESPIRATION RATE: 16 BRPM | DIASTOLIC BLOOD PRESSURE: 77 MMHG

## 2022-03-28 DIAGNOSIS — S16.1XXA STRAIN OF NECK MUSCLE, INITIAL ENCOUNTER: Primary | ICD-10-CM

## 2022-03-28 DIAGNOSIS — R00.2 PALPITATIONS: ICD-10-CM

## 2022-03-28 LAB
BASOPHILS # BLD AUTO: 0.03 X10(3) UL (ref 0–0.2)
BASOPHILS NFR BLD AUTO: 0.4 %
BUN BLD-MCNC: 8 MG/DL (ref 7–18)
CALCIUM BLD-MCNC: 8.9 MG/DL (ref 8.5–10.1)
CHLORIDE SERPL-SCNC: 106 MMOL/L (ref 98–112)
CO2 SERPL-SCNC: 28 MMOL/L (ref 21–32)
CREAT BLD-MCNC: 0.93 MG/DL
EOSINOPHIL # BLD AUTO: 0.16 X10(3) UL (ref 0–0.7)
EOSINOPHIL NFR BLD AUTO: 2.2 %
ERYTHROCYTE [DISTWIDTH] IN BLOOD BY AUTOMATED COUNT: 11.9 %
GLUCOSE BLD-MCNC: 89 MG/DL (ref 70–99)
HCT VFR BLD AUTO: 43.8 %
HGB BLD-MCNC: 14.5 G/DL
IMM GRANULOCYTES # BLD AUTO: 0.01 X10(3) UL (ref 0–1)
IMM GRANULOCYTES NFR BLD: 0.1 %
LYMPHOCYTES # BLD AUTO: 2.68 X10(3) UL (ref 1–4)
MAGNESIUM SERPL-MCNC: 1.8 MG/DL (ref 1.6–2.6)
MCH RBC QN AUTO: 32.3 PG (ref 26–34)
MCHC RBC AUTO-ENTMCNC: 33.1 G/DL (ref 31–37)
MCV RBC AUTO: 97.6 FL
MONOCYTES # BLD AUTO: 0.65 X10(3) UL (ref 0.1–1)
MONOCYTES NFR BLD AUTO: 9 %
NEUTROPHILS # BLD AUTO: 3.71 X10 (3) UL (ref 1.5–7.7)
NEUTROPHILS # BLD AUTO: 3.71 X10(3) UL (ref 1.5–7.7)
NEUTROPHILS NFR BLD AUTO: 51.3 %
OSMOLALITY SERPL CALC.SUM OF ELEC: 282 MOSM/KG (ref 275–295)
PLATELET # BLD AUTO: 179 10(3)UL (ref 150–450)
POTASSIUM SERPL-SCNC: 3.9 MMOL/L (ref 3.5–5.1)
RBC # BLD AUTO: 4.49 X10(6)UL
SODIUM SERPL-SCNC: 137 MMOL/L (ref 136–145)

## 2022-03-28 PROCEDURE — 80048 BASIC METABOLIC PNL TOTAL CA: CPT | Performed by: STUDENT IN AN ORGANIZED HEALTH CARE EDUCATION/TRAINING PROGRAM

## 2022-03-28 PROCEDURE — 93005 ELECTROCARDIOGRAM TRACING: CPT

## 2022-03-28 PROCEDURE — 99284 EMERGENCY DEPT VISIT MOD MDM: CPT

## 2022-03-28 PROCEDURE — 83735 ASSAY OF MAGNESIUM: CPT | Performed by: STUDENT IN AN ORGANIZED HEALTH CARE EDUCATION/TRAINING PROGRAM

## 2022-03-28 PROCEDURE — 85025 COMPLETE CBC W/AUTO DIFF WBC: CPT | Performed by: STUDENT IN AN ORGANIZED HEALTH CARE EDUCATION/TRAINING PROGRAM

## 2022-03-28 PROCEDURE — 96374 THER/PROPH/DIAG INJ IV PUSH: CPT

## 2022-03-28 PROCEDURE — 93010 ELECTROCARDIOGRAM REPORT: CPT

## 2022-03-28 RX ORDER — KETOROLAC TROMETHAMINE 15 MG/ML
15 INJECTION, SOLUTION INTRAMUSCULAR; INTRAVENOUS ONCE
Status: COMPLETED | OUTPATIENT
Start: 2022-03-28 | End: 2022-03-28

## 2022-03-28 RX ORDER — DIAZEPAM 5 MG/1
5 TABLET ORAL ONCE
Status: COMPLETED | OUTPATIENT
Start: 2022-03-28 | End: 2022-03-28

## 2022-03-28 RX ORDER — CYCLOBENZAPRINE HCL 10 MG
10 TABLET ORAL 3 TIMES DAILY PRN
Qty: 20 TABLET | Refills: 0 | Status: SHIPPED | OUTPATIENT
Start: 2022-03-28 | End: 2022-04-04

## 2022-03-28 NOTE — ED INITIAL ASSESSMENT (HPI)
Pt has been having chest pain and tightness that has been going down his r arm. Pt states he did have an energy drink today.

## 2022-03-31 LAB
ATRIAL RATE: 97 BPM
P AXIS: 80 DEGREES
P-R INTERVAL: 152 MS
Q-T INTERVAL: 354 MS
QRS DURATION: 82 MS
R AXIS: -8 DEGREES
T AXIS: 62 DEGREES

## 2022-06-04 ENCOUNTER — HOSPITAL ENCOUNTER (OUTPATIENT)
Age: 37
Discharge: HOME | End: 2022-06-04
Payer: MEDICAID

## 2022-06-04 VITALS
OXYGEN SATURATION: 99 % | HEIGHT: 71 IN | HEART RATE: 101 BPM | TEMPERATURE: 99 F | DIASTOLIC BLOOD PRESSURE: 72 MMHG | RESPIRATION RATE: 17 BRPM | WEIGHT: 150 LBS | BODY MASS INDEX: 21 KG/M2 | SYSTOLIC BLOOD PRESSURE: 114 MMHG

## 2022-06-04 DIAGNOSIS — J06.9 VIRAL URI: Primary | ICD-10-CM

## 2022-06-04 LAB — SARS-COV-2 RNA RESP QL NAA+PROBE: NOT DETECTED

## 2022-06-04 RX ORDER — METHYLPREDNISOLONE 4 MG/1
TABLET ORAL
Qty: 1 EACH | Refills: 0 | Status: SHIPPED | OUTPATIENT
Start: 2022-06-04

## 2022-07-01 ENCOUNTER — HOSPITAL ENCOUNTER (EMERGENCY)
Age: 37
Discharge: HOME | End: 2022-07-01
Attending: EMERGENCY MEDICINE
Payer: MEDICAID

## 2022-07-01 ENCOUNTER — APPOINTMENT (OUTPATIENT)
Dept: GENERAL RADIOLOGY | Age: 37
End: 2022-07-01
Attending: EMERGENCY MEDICINE
Payer: MEDICAID

## 2022-07-01 VITALS
HEIGHT: 71 IN | SYSTOLIC BLOOD PRESSURE: 106 MMHG | WEIGHT: 145 LBS | RESPIRATION RATE: 18 BRPM | BODY MASS INDEX: 20.3 KG/M2 | TEMPERATURE: 98 F | HEART RATE: 82 BPM | OXYGEN SATURATION: 100 % | DIASTOLIC BLOOD PRESSURE: 63 MMHG

## 2022-07-01 DIAGNOSIS — R06.02 SHORTNESS OF BREATH: Primary | ICD-10-CM

## 2022-07-01 DIAGNOSIS — R00.2 PALPITATIONS: ICD-10-CM

## 2022-07-01 LAB
ALBUMIN SERPL-MCNC: 4 G/DL (ref 3.4–5)
ALBUMIN/GLOB SERPL: 1.3 {RATIO} (ref 1–2)
ALP LIVER SERPL-CCNC: 50 U/L
ALT SERPL-CCNC: 21 U/L
ANION GAP SERPL CALC-SCNC: 4 MMOL/L (ref 0–18)
AST SERPL-CCNC: 15 U/L (ref 15–37)
BASOPHILS # BLD AUTO: 0.04 X10(3) UL (ref 0–0.2)
BASOPHILS NFR BLD AUTO: 0.5 %
BILIRUB SERPL-MCNC: 0.5 MG/DL (ref 0.1–2)
BILIRUB UR QL STRIP.AUTO: NEGATIVE
BUN BLD-MCNC: 11 MG/DL (ref 7–18)
CALCIUM BLD-MCNC: 8.9 MG/DL (ref 8.5–10.1)
CHLORIDE SERPL-SCNC: 104 MMOL/L (ref 98–112)
CLARITY UR REFRACT.AUTO: CLEAR
CO2 SERPL-SCNC: 29 MMOL/L (ref 21–32)
COLOR UR AUTO: YELLOW
CREAT BLD-MCNC: 0.93 MG/DL
EOSINOPHIL # BLD AUTO: 0.25 X10(3) UL (ref 0–0.7)
EOSINOPHIL NFR BLD AUTO: 3.2 %
ERYTHROCYTE [DISTWIDTH] IN BLOOD BY AUTOMATED COUNT: 12.1 %
GLOBULIN PLAS-MCNC: 3.2 G/DL (ref 2.8–4.4)
GLUCOSE BLD-MCNC: 89 MG/DL (ref 70–99)
GLUCOSE UR STRIP.AUTO-MCNC: NEGATIVE MG/DL
HCT VFR BLD AUTO: 41 %
HGB BLD-MCNC: 13.7 G/DL
IMM GRANULOCYTES # BLD AUTO: 0.01 X10(3) UL (ref 0–1)
IMM GRANULOCYTES NFR BLD: 0.1 %
KETONES UR STRIP.AUTO-MCNC: NEGATIVE MG/DL
LIPASE SERPL-CCNC: 102 U/L (ref 73–393)
LYMPHOCYTES # BLD AUTO: 3.67 X10(3) UL (ref 1–4)
LYMPHOCYTES NFR BLD AUTO: 47.6 %
MCH RBC QN AUTO: 31.9 PG (ref 26–34)
MCHC RBC AUTO-ENTMCNC: 33.4 G/DL (ref 31–37)
MCV RBC AUTO: 95.6 FL
MONOCYTES # BLD AUTO: 0.6 X10(3) UL (ref 0.1–1)
MONOCYTES NFR BLD AUTO: 7.8 %
NEUTROPHILS # BLD AUTO: 3.14 X10 (3) UL (ref 1.5–7.7)
NEUTROPHILS # BLD AUTO: 3.14 X10(3) UL (ref 1.5–7.7)
NEUTROPHILS NFR BLD AUTO: 40.8 %
NITRITE UR QL STRIP.AUTO: NEGATIVE
OSMOLALITY SERPL CALC.SUM OF ELEC: 283 MOSM/KG (ref 275–295)
PH UR STRIP.AUTO: 7 [PH] (ref 5–8)
PLATELET # BLD AUTO: 184 10(3)UL (ref 150–450)
POTASSIUM SERPL-SCNC: 4.3 MMOL/L (ref 3.5–5.1)
PROT SERPL-MCNC: 7.2 G/DL (ref 6.4–8.2)
PROT UR STRIP.AUTO-MCNC: NEGATIVE MG/DL
RBC # BLD AUTO: 4.29 X10(6)UL
RBC UR QL AUTO: NEGATIVE
SARS-COV-2 RNA RESP QL NAA+PROBE: NOT DETECTED
SODIUM SERPL-SCNC: 137 MMOL/L (ref 136–145)
SP GR UR STRIP.AUTO: 1.01 (ref 1–1.03)
TROPONIN I HIGH SENSITIVITY: 5 NG/L
UROBILINOGEN UR STRIP.AUTO-MCNC: 0.2 MG/DL
WBC # BLD AUTO: 7.7 X10(3) UL (ref 4–11)

## 2022-07-01 PROCEDURE — 36415 COLL VENOUS BLD VENIPUNCTURE: CPT

## 2022-07-01 PROCEDURE — 99284 EMERGENCY DEPT VISIT MOD MDM: CPT

## 2022-07-01 PROCEDURE — 83690 ASSAY OF LIPASE: CPT | Performed by: EMERGENCY MEDICINE

## 2022-07-01 PROCEDURE — 87086 URINE CULTURE/COLONY COUNT: CPT | Performed by: EMERGENCY MEDICINE

## 2022-07-01 PROCEDURE — 84484 ASSAY OF TROPONIN QUANT: CPT | Performed by: EMERGENCY MEDICINE

## 2022-07-01 PROCEDURE — 93005 ELECTROCARDIOGRAM TRACING: CPT

## 2022-07-01 PROCEDURE — 85025 COMPLETE CBC W/AUTO DIFF WBC: CPT | Performed by: EMERGENCY MEDICINE

## 2022-07-01 PROCEDURE — 81001 URINALYSIS AUTO W/SCOPE: CPT | Performed by: EMERGENCY MEDICINE

## 2022-07-01 PROCEDURE — 93010 ELECTROCARDIOGRAM REPORT: CPT

## 2022-07-01 PROCEDURE — 80053 COMPREHEN METABOLIC PANEL: CPT | Performed by: EMERGENCY MEDICINE

## 2022-07-01 PROCEDURE — 71045 X-RAY EXAM CHEST 1 VIEW: CPT | Performed by: EMERGENCY MEDICINE

## 2022-07-01 RX ORDER — ALPRAZOLAM 0.5 MG/1
0.5 TABLET ORAL 3 TIMES DAILY PRN
Qty: 20 TABLET | Refills: 0 | Status: SHIPPED | OUTPATIENT
Start: 2022-07-01 | End: 2022-07-08

## 2022-07-01 RX ORDER — LIDOCAINE HYDROCHLORIDE 20 MG/ML
10 SOLUTION OROPHARYNGEAL ONCE
Status: COMPLETED | OUTPATIENT
Start: 2022-07-01 | End: 2022-07-01

## 2022-07-01 RX ORDER — MAGNESIUM HYDROXIDE/ALUMINUM HYDROXICE/SIMETHICONE 120; 1200; 1200 MG/30ML; MG/30ML; MG/30ML
30 SUSPENSION ORAL ONCE
Status: COMPLETED | OUTPATIENT
Start: 2022-07-01 | End: 2022-07-01

## 2022-07-01 NOTE — ED INITIAL ASSESSMENT (HPI)
Pt states since 1230 pm today felt sob and chest pain, states works for [employer] and was driving at the time, states has episodes of fast heart rate

## 2022-07-02 LAB
ATRIAL RATE: 86 BPM
P AXIS: 71 DEGREES
P-R INTERVAL: 150 MS
Q-T INTERVAL: 352 MS
QRS DURATION: 90 MS
QTC CALCULATION (BEZET): 421 MS
R AXIS: 22 DEGREES
T AXIS: 41 DEGREES
VENTRICULAR RATE: 86 BPM

## 2023-03-08 ENCOUNTER — APPOINTMENT (OUTPATIENT)
Dept: GENERAL RADIOLOGY | Facility: HOSPITAL | Age: 38
End: 2023-03-08
Attending: EMERGENCY MEDICINE
Payer: MEDICAID

## 2023-03-08 ENCOUNTER — APPOINTMENT (OUTPATIENT)
Dept: CT IMAGING | Facility: HOSPITAL | Age: 38
End: 2023-03-08
Attending: EMERGENCY MEDICINE
Payer: MEDICAID

## 2023-03-08 ENCOUNTER — HOSPITAL ENCOUNTER (EMERGENCY)
Facility: HOSPITAL | Age: 38
Discharge: HOME | End: 2023-03-08
Attending: EMERGENCY MEDICINE
Payer: MEDICAID

## 2023-03-08 VITALS
DIASTOLIC BLOOD PRESSURE: 59 MMHG | OXYGEN SATURATION: 99 % | TEMPERATURE: 100 F | WEIGHT: 150 LBS | SYSTOLIC BLOOD PRESSURE: 103 MMHG | BODY MASS INDEX: 21 KG/M2 | RESPIRATION RATE: 13 BRPM | HEIGHT: 71 IN | HEART RATE: 56 BPM

## 2023-03-08 DIAGNOSIS — M79.10 MYALGIA: ICD-10-CM

## 2023-03-08 DIAGNOSIS — R53.83 OTHER FATIGUE: Primary | ICD-10-CM

## 2023-03-08 DIAGNOSIS — R10.12 ABDOMINAL PAIN, LEFT UPPER QUADRANT: ICD-10-CM

## 2023-03-08 LAB
ALBUMIN SERPL-MCNC: 3.7 G/DL (ref 3.4–5)
ALBUMIN/GLOB SERPL: 1.1 {RATIO} (ref 1–2)
ALP LIVER SERPL-CCNC: 54 U/L
ALT SERPL-CCNC: 20 U/L
ANION GAP SERPL CALC-SCNC: 4 MMOL/L (ref 0–18)
AST SERPL-CCNC: 12 U/L (ref 15–37)
BASOPHILS # BLD AUTO: 0.03 X10(3) UL (ref 0–0.2)
BASOPHILS NFR BLD AUTO: 0.6 %
BILIRUB SERPL-MCNC: 0.3 MG/DL (ref 0.1–2)
BILIRUB UR QL STRIP.AUTO: NEGATIVE
BUN BLD-MCNC: 15 MG/DL (ref 7–18)
CALCIUM BLD-MCNC: 9 MG/DL (ref 8.5–10.1)
CHLORIDE SERPL-SCNC: 108 MMOL/L (ref 98–112)
CLARITY UR REFRACT.AUTO: CLEAR
CO2 SERPL-SCNC: 26 MMOL/L (ref 21–32)
CREAT BLD-MCNC: 0.94 MG/DL
EOSINOPHIL # BLD AUTO: 0.16 X10(3) UL (ref 0–0.7)
EOSINOPHIL NFR BLD AUTO: 2.9 %
ERYTHROCYTE [DISTWIDTH] IN BLOOD BY AUTOMATED COUNT: 11.8 %
ERYTHROCYTE [SEDIMENTATION RATE] IN BLOOD: 5 MM/HR
FLUAV + FLUBV RNA SPEC NAA+PROBE: NEGATIVE
FLUAV + FLUBV RNA SPEC NAA+PROBE: NEGATIVE
GFR SERPLBLD BASED ON 1.73 SQ M-ARVRAT: 106 ML/MIN/1.73M2 (ref 60–?)
GLOBULIN PLAS-MCNC: 3.5 G/DL (ref 2.8–4.4)
GLUCOSE BLD-MCNC: 116 MG/DL (ref 70–99)
GLUCOSE UR STRIP.AUTO-MCNC: NEGATIVE MG/DL
HCT VFR BLD AUTO: 41.2 %
HETEROPH AB SER QL: NEGATIVE
HGB BLD-MCNC: 13.9 G/DL
IMM GRANULOCYTES # BLD AUTO: 0.01 X10(3) UL (ref 0–1)
IMM GRANULOCYTES NFR BLD: 0.2 %
KETONES UR STRIP.AUTO-MCNC: NEGATIVE MG/DL
LEUKOCYTE ESTERASE UR QL STRIP.AUTO: NEGATIVE
LIPASE SERPL-CCNC: 124 U/L (ref 73–393)
LIPASE SERPL-CCNC: 125 U/L (ref 73–393)
LIPASE SERPL-CCNC: 33 U/L (ref 13–75)
LIPASE SERPL-CCNC: 33 U/L (ref 13–75)
LYMPHOCYTES # BLD AUTO: 1.99 X10(3) UL (ref 1–4)
LYMPHOCYTES NFR BLD AUTO: 36.5 %
MCH RBC QN AUTO: 32.2 PG (ref 26–34)
MCHC RBC AUTO-ENTMCNC: 33.7 G/DL (ref 31–37)
MCV RBC AUTO: 95.4 FL
MONOCYTES # BLD AUTO: 0.47 X10(3) UL (ref 0.1–1)
MONOCYTES NFR BLD AUTO: 8.6 %
NEUTROPHILS # BLD AUTO: 2.79 X10 (3) UL (ref 1.5–7.7)
NEUTROPHILS # BLD AUTO: 2.79 X10(3) UL (ref 1.5–7.7)
NEUTROPHILS NFR BLD AUTO: 51.2 %
NITRITE UR QL STRIP.AUTO: NEGATIVE
OSMOLALITY SERPL CALC.SUM OF ELEC: 288 MOSM/KG (ref 275–295)
PH UR STRIP.AUTO: 7 [PH] (ref 5–8)
PLATELET # BLD AUTO: 196 10(3)UL (ref 150–450)
POTASSIUM SERPL-SCNC: 4.1 MMOL/L (ref 3.5–5.1)
PROT SERPL-MCNC: 7.2 G/DL (ref 6.4–8.2)
PROT UR STRIP.AUTO-MCNC: NEGATIVE MG/DL
RBC # BLD AUTO: 4.32 X10(6)UL
RBC UR QL AUTO: NEGATIVE
RSV RNA SPEC NAA+PROBE: NEGATIVE
SARS-COV-2 RNA RESP QL NAA+PROBE: NOT DETECTED
SODIUM SERPL-SCNC: 138 MMOL/L (ref 136–145)
SP GR UR STRIP.AUTO: 1 (ref 1–1.03)
TROPONIN I HIGH SENSITIVITY: 4 NG/L
UROBILINOGEN UR STRIP.AUTO-MCNC: <2 MG/DL
WBC # BLD AUTO: 5.5 X10(3) UL (ref 4–11)

## 2023-03-08 PROCEDURE — 0241U SARS-COV-2/FLU A AND B/RSV BY PCR (GENEXPERT): CPT

## 2023-03-08 PROCEDURE — 85652 RBC SED RATE AUTOMATED: CPT | Performed by: EMERGENCY MEDICINE

## 2023-03-08 PROCEDURE — 36415 COLL VENOUS BLD VENIPUNCTURE: CPT

## 2023-03-08 PROCEDURE — 99285 EMERGENCY DEPT VISIT HI MDM: CPT

## 2023-03-08 PROCEDURE — 87591 N.GONORRHOEAE DNA AMP PROB: CPT

## 2023-03-08 PROCEDURE — 80053 COMPREHEN METABOLIC PANEL: CPT

## 2023-03-08 PROCEDURE — 0241U SARS-COV-2/FLU A AND B/RSV BY PCR (GENEXPERT): CPT | Performed by: EMERGENCY MEDICINE

## 2023-03-08 PROCEDURE — 86403 PARTICLE AGGLUT ANTBDY SCRN: CPT | Performed by: EMERGENCY MEDICINE

## 2023-03-08 PROCEDURE — 93010 ELECTROCARDIOGRAM REPORT: CPT

## 2023-03-08 PROCEDURE — 83690 ASSAY OF LIPASE: CPT | Performed by: EMERGENCY MEDICINE

## 2023-03-08 PROCEDURE — 87491 CHLMYD TRACH DNA AMP PROBE: CPT

## 2023-03-08 PROCEDURE — 71045 X-RAY EXAM CHEST 1 VIEW: CPT | Performed by: EMERGENCY MEDICINE

## 2023-03-08 PROCEDURE — 84484 ASSAY OF TROPONIN QUANT: CPT | Performed by: EMERGENCY MEDICINE

## 2023-03-08 PROCEDURE — 81003 URINALYSIS AUTO W/O SCOPE: CPT

## 2023-03-08 PROCEDURE — 74176 CT ABD & PELVIS W/O CONTRAST: CPT | Performed by: EMERGENCY MEDICINE

## 2023-03-08 PROCEDURE — 86664 EPSTEIN-BARR NUCLEAR ANTIGEN: CPT | Performed by: EMERGENCY MEDICINE

## 2023-03-08 PROCEDURE — 83690 ASSAY OF LIPASE: CPT

## 2023-03-08 PROCEDURE — 86665 EPSTEIN-BARR CAPSID VCA: CPT | Performed by: EMERGENCY MEDICINE

## 2023-03-08 PROCEDURE — 85025 COMPLETE CBC W/AUTO DIFF WBC: CPT

## 2023-03-08 PROCEDURE — 93005 ELECTROCARDIOGRAM TRACING: CPT

## 2023-03-08 RX ORDER — PANTOPRAZOLE SODIUM 40 MG/1
40 TABLET, DELAYED RELEASE ORAL DAILY
Qty: 30 TABLET | Refills: 0 | Status: SHIPPED | OUTPATIENT
Start: 2023-03-08 | End: 2023-04-07

## 2023-03-08 NOTE — ED INITIAL ASSESSMENT (HPI)
Patient arrives to the ED with c/o feling like he has been super fatigued for the last month, last couple of days left flank pain and pressure in his head. Feeling winded. Would like to find out what's going on today.

## 2023-03-09 LAB
ATRIAL RATE: 66 BPM
C TRACH DNA SPEC QL NAA+PROBE: NEGATIVE
N GONORRHOEA DNA SPEC QL NAA+PROBE: NEGATIVE
P AXIS: 64 DEGREES
P-R INTERVAL: 162 MS
Q-T INTERVAL: 394 MS
QRS DURATION: 74 MS
QTC CALCULATION (BEZET): 413 MS
R AXIS: -6 DEGREES
T AXIS: 40 DEGREES
VENTRICULAR RATE: 66 BPM

## 2023-03-10 LAB
EBV NA IGG SER QL IA: POSITIVE
EBV VCA IGG SER QL IA: POSITIVE
EBV VCA IGM SER QL IA: NEGATIVE

## 2023-03-13 ENCOUNTER — HOSPITAL ENCOUNTER (EMERGENCY)
Facility: HOSPITAL | Age: 38
Discharge: HOME | End: 2023-03-13
Attending: EMERGENCY MEDICINE
Payer: MEDICAID

## 2023-03-13 ENCOUNTER — APPOINTMENT (OUTPATIENT)
Dept: CT IMAGING | Facility: HOSPITAL | Age: 38
End: 2023-03-13
Attending: EMERGENCY MEDICINE
Payer: MEDICAID

## 2023-03-13 VITALS
TEMPERATURE: 98 F | SYSTOLIC BLOOD PRESSURE: 111 MMHG | HEART RATE: 93 BPM | OXYGEN SATURATION: 98 % | DIASTOLIC BLOOD PRESSURE: 69 MMHG | WEIGHT: 150 LBS | RESPIRATION RATE: 18 BRPM | BODY MASS INDEX: 21 KG/M2 | HEIGHT: 71 IN

## 2023-03-13 DIAGNOSIS — R10.9 ABDOMINAL PAIN OF UNKNOWN ETIOLOGY: Primary | ICD-10-CM

## 2023-03-13 LAB
ALBUMIN SERPL-MCNC: 3.6 G/DL (ref 3.4–5)
ALBUMIN/GLOB SERPL: 0.9 {RATIO} (ref 1–2)
ALP LIVER SERPL-CCNC: 62 U/L
ALT SERPL-CCNC: 28 U/L
ANION GAP SERPL CALC-SCNC: <0 MMOL/L (ref 0–18)
AST SERPL-CCNC: 27 U/L (ref 15–37)
BASOPHILS # BLD AUTO: 0.03 X10(3) UL (ref 0–0.2)
BASOPHILS NFR BLD AUTO: 0.4 %
BILIRUB SERPL-MCNC: 0.4 MG/DL (ref 0.1–2)
BILIRUB UR QL STRIP.AUTO: NEGATIVE
BUN BLD-MCNC: 7 MG/DL (ref 7–18)
CALCIUM BLD-MCNC: 8.5 MG/DL (ref 8.5–10.1)
CHLORIDE SERPL-SCNC: 106 MMOL/L (ref 98–112)
CLARITY UR REFRACT.AUTO: CLEAR
CO2 SERPL-SCNC: 31 MMOL/L (ref 21–32)
CREAT BLD-MCNC: 0.97 MG/DL
EOSINOPHIL # BLD AUTO: 0.03 X10(3) UL (ref 0–0.7)
EOSINOPHIL NFR BLD AUTO: 0.4 %
ERYTHROCYTE [DISTWIDTH] IN BLOOD BY AUTOMATED COUNT: 11.8 %
GFR SERPLBLD BASED ON 1.73 SQ M-ARVRAT: 102 ML/MIN/1.73M2 (ref 60–?)
GLOBULIN PLAS-MCNC: 3.9 G/DL (ref 2.8–4.4)
GLUCOSE BLD-MCNC: 105 MG/DL (ref 70–99)
GLUCOSE UR STRIP.AUTO-MCNC: NEGATIVE MG/DL
HCT VFR BLD AUTO: 41.1 %
HGB BLD-MCNC: 13.8 G/DL
IMM GRANULOCYTES # BLD AUTO: 0.02 X10(3) UL (ref 0–1)
IMM GRANULOCYTES NFR BLD: 0.2 %
KETONES UR STRIP.AUTO-MCNC: NEGATIVE MG/DL
LIPASE SERPL-CCNC: 22 U/L (ref 13–75)
LIPASE SERPL-CCNC: 77 U/L (ref 73–393)
LYMPHOCYTES # BLD AUTO: 1.73 X10(3) UL (ref 1–4)
LYMPHOCYTES NFR BLD AUTO: 20.7 %
MCH RBC QN AUTO: 31.2 PG (ref 26–34)
MCHC RBC AUTO-ENTMCNC: 33.6 G/DL (ref 31–37)
MCV RBC AUTO: 93 FL
MONOCYTES # BLD AUTO: 1.1 X10(3) UL (ref 0.1–1)
MONOCYTES NFR BLD AUTO: 13.2 %
NEUTROPHILS # BLD AUTO: 5.44 X10 (3) UL (ref 1.5–7.7)
NEUTROPHILS # BLD AUTO: 5.44 X10(3) UL (ref 1.5–7.7)
NEUTROPHILS NFR BLD AUTO: 65.1 %
NITRITE UR QL STRIP.AUTO: NEGATIVE
OSMOLALITY SERPL CALC.SUM OF ELEC: 280 MOSM/KG (ref 275–295)
PH UR STRIP.AUTO: 6 [PH] (ref 5–8)
PLATELET # BLD AUTO: 159 10(3)UL (ref 150–450)
POTASSIUM SERPL-SCNC: 4.3 MMOL/L (ref 3.5–5.1)
PROT SERPL-MCNC: 7.5 G/DL (ref 6.4–8.2)
PROT UR STRIP.AUTO-MCNC: NEGATIVE MG/DL
RBC # BLD AUTO: 4.42 X10(6)UL
SARS-COV-2 RNA RESP QL NAA+PROBE: NOT DETECTED
SODIUM SERPL-SCNC: 136 MMOL/L (ref 136–145)
SP GR UR STRIP.AUTO: 1 (ref 1–1.03)
UROBILINOGEN UR STRIP.AUTO-MCNC: <2 MG/DL
WBC # BLD AUTO: 8.4 X10(3) UL (ref 4–11)

## 2023-03-13 PROCEDURE — 74177 CT ABD & PELVIS W/CONTRAST: CPT | Performed by: EMERGENCY MEDICINE

## 2023-03-13 PROCEDURE — 96360 HYDRATION IV INFUSION INIT: CPT

## 2023-03-13 PROCEDURE — 87086 URINE CULTURE/COLONY COUNT: CPT | Performed by: EMERGENCY MEDICINE

## 2023-03-13 PROCEDURE — 99285 EMERGENCY DEPT VISIT HI MDM: CPT

## 2023-03-13 PROCEDURE — 81001 URINALYSIS AUTO W/SCOPE: CPT | Performed by: EMERGENCY MEDICINE

## 2023-03-13 PROCEDURE — 85025 COMPLETE CBC W/AUTO DIFF WBC: CPT | Performed by: EMERGENCY MEDICINE

## 2023-03-13 PROCEDURE — 99284 EMERGENCY DEPT VISIT MOD MDM: CPT

## 2023-03-13 PROCEDURE — 83690 ASSAY OF LIPASE: CPT | Performed by: EMERGENCY MEDICINE

## 2023-03-13 PROCEDURE — 80053 COMPREHEN METABOLIC PANEL: CPT | Performed by: EMERGENCY MEDICINE

## 2023-03-13 RX ORDER — SODIUM CHLORIDE 9 MG/ML
125 INJECTION, SOLUTION INTRAVENOUS CONTINUOUS
Status: DISCONTINUED | OUTPATIENT
Start: 2023-03-13 | End: 2023-03-13

## 2023-03-13 NOTE — ED INITIAL ASSESSMENT (HPI)
Pt reports he tested positive for Mono on Friday. States he was seen in the ER on Wednesday. Reports ongoing pain LLQ abdomen and fever, states that he needs a doctors note for work and is not able to see his PCP until Thursday. Sates he is only taking ibuprofen at home.

## 2023-03-13 NOTE — DISCHARGE INSTRUCTIONS
Follow-up for further evaluation primary physician. Call for appointment. Return if increased abdominal pain, vomiting, blood in stool, new or worse symptoms. May use Tyle or ibuprofen as needed. Rest, plenty of fluids. MiraLAX as discussed.

## 2023-04-01 ENCOUNTER — HOSPITAL ENCOUNTER (EMERGENCY)
Facility: HOSPITAL | Age: 38
Discharge: HOME | End: 2023-04-01
Attending: STUDENT IN AN ORGANIZED HEALTH CARE EDUCATION/TRAINING PROGRAM
Payer: MEDICAID

## 2023-04-01 DIAGNOSIS — R51.9 ACUTE NONINTRACTABLE HEADACHE, UNSPECIFIED HEADACHE TYPE: ICD-10-CM

## 2023-04-01 DIAGNOSIS — B27.90 INFECTIOUS MONONUCLEOSIS WITHOUT COMPLICATION, INFECTIOUS MONONUCLEOSIS DUE TO UNSPECIFIED ORGANISM: Primary | ICD-10-CM

## 2023-04-01 PROCEDURE — 99283 EMERGENCY DEPT VISIT LOW MDM: CPT

## 2023-04-01 PROCEDURE — 99282 EMERGENCY DEPT VISIT SF MDM: CPT

## 2023-04-02 NOTE — ED INITIAL ASSESSMENT (HPI)
Pt C/O head pain and pressure, unable to focus or concentrate and running temp 100.3 at home. Pt has followed up with PMD about this but no diagnosis.

## 2023-04-10 ENCOUNTER — APPOINTMENT (OUTPATIENT)
Dept: CT IMAGING | Facility: HOSPITAL | Age: 38
End: 2023-04-10
Attending: EMERGENCY MEDICINE
Payer: MEDICAID

## 2023-04-10 ENCOUNTER — HOSPITAL ENCOUNTER (EMERGENCY)
Facility: HOSPITAL | Age: 38
Discharge: HOME | End: 2023-04-10
Attending: EMERGENCY MEDICINE
Payer: MEDICAID

## 2023-04-10 VITALS
BODY MASS INDEX: 21.7 KG/M2 | SYSTOLIC BLOOD PRESSURE: 122 MMHG | TEMPERATURE: 98 F | RESPIRATION RATE: 16 BRPM | HEART RATE: 70 BPM | DIASTOLIC BLOOD PRESSURE: 65 MMHG | HEIGHT: 71 IN | OXYGEN SATURATION: 99 % | WEIGHT: 155 LBS

## 2023-04-10 DIAGNOSIS — R10.9 FLANK PAIN: Primary | ICD-10-CM

## 2023-04-10 LAB
ALBUMIN SERPL-MCNC: 4.3 G/DL (ref 3.4–5)
ALBUMIN/GLOB SERPL: 1.1 {RATIO} (ref 1–2)
ALP LIVER SERPL-CCNC: 52 U/L
ALT SERPL-CCNC: 28 U/L
ANION GAP SERPL CALC-SCNC: 4 MMOL/L (ref 0–18)
AST SERPL-CCNC: 13 U/L (ref 15–37)
BASOPHILS # BLD AUTO: 0.04 X10(3) UL (ref 0–0.2)
BASOPHILS NFR BLD AUTO: 0.5 %
BILIRUB SERPL-MCNC: 0.8 MG/DL (ref 0.1–2)
BILIRUB UR QL STRIP.AUTO: NEGATIVE
BUN BLD-MCNC: 13 MG/DL (ref 7–18)
CALCIUM BLD-MCNC: 9.4 MG/DL (ref 8.5–10.1)
CHLORIDE SERPL-SCNC: 104 MMOL/L (ref 98–112)
CLARITY UR REFRACT.AUTO: CLEAR
CO2 SERPL-SCNC: 27 MMOL/L (ref 21–32)
CREAT BLD-MCNC: 0.84 MG/DL
EOSINOPHIL # BLD AUTO: 0.19 X10(3) UL (ref 0–0.7)
EOSINOPHIL NFR BLD AUTO: 2.6 %
ERYTHROCYTE [DISTWIDTH] IN BLOOD BY AUTOMATED COUNT: 11.8 %
GFR SERPLBLD BASED ON 1.73 SQ M-ARVRAT: 114 ML/MIN/1.73M2 (ref 60–?)
GLOBULIN PLAS-MCNC: 3.9 G/DL (ref 2.8–4.4)
GLUCOSE BLD-MCNC: 90 MG/DL (ref 70–99)
GLUCOSE UR STRIP.AUTO-MCNC: NEGATIVE MG/DL
HCT VFR BLD AUTO: 44.2 %
HGB BLD-MCNC: 15 G/DL
IMM GRANULOCYTES # BLD AUTO: 0.02 X10(3) UL (ref 0–1)
IMM GRANULOCYTES NFR BLD: 0.3 %
KETONES UR STRIP.AUTO-MCNC: NEGATIVE MG/DL
LEUKOCYTE ESTERASE UR QL STRIP.AUTO: NEGATIVE
LIPASE SERPL-CCNC: 25 U/L (ref 13–75)
LYMPHOCYTES # BLD AUTO: 2.83 X10(3) UL (ref 1–4)
LYMPHOCYTES NFR BLD AUTO: 38.1 %
MCH RBC QN AUTO: 31 PG (ref 26–34)
MCHC RBC AUTO-ENTMCNC: 33.9 G/DL (ref 31–37)
MCV RBC AUTO: 91.3 FL
MONOCYTES # BLD AUTO: 0.54 X10(3) UL (ref 0.1–1)
MONOCYTES NFR BLD AUTO: 7.3 %
NEUTROPHILS # BLD AUTO: 3.81 X10 (3) UL (ref 1.5–7.7)
NEUTROPHILS # BLD AUTO: 3.81 X10(3) UL (ref 1.5–7.7)
NEUTROPHILS NFR BLD AUTO: 51.2 %
NITRITE UR QL STRIP.AUTO: NEGATIVE
OSMOLALITY SERPL CALC.SUM OF ELEC: 280 MOSM/KG (ref 275–295)
PH UR STRIP.AUTO: 6 [PH] (ref 5–8)
PLATELET # BLD AUTO: 182 10(3)UL (ref 150–450)
POTASSIUM SERPL-SCNC: 3.8 MMOL/L (ref 3.5–5.1)
PROT SERPL-MCNC: 8.2 G/DL (ref 6.4–8.2)
PROT UR STRIP.AUTO-MCNC: NEGATIVE MG/DL
RBC # BLD AUTO: 4.84 X10(6)UL
RBC UR QL AUTO: NEGATIVE
SODIUM SERPL-SCNC: 135 MMOL/L (ref 136–145)
SP GR UR STRIP.AUTO: 1 (ref 1–1.03)
UROBILINOGEN UR STRIP.AUTO-MCNC: <2 MG/DL
WBC # BLD AUTO: 7.4 X10(3) UL (ref 4–11)

## 2023-04-10 PROCEDURE — 81003 URINALYSIS AUTO W/O SCOPE: CPT

## 2023-04-10 PROCEDURE — 83690 ASSAY OF LIPASE: CPT | Performed by: EMERGENCY MEDICINE

## 2023-04-10 PROCEDURE — 74176 CT ABD & PELVIS W/O CONTRAST: CPT | Performed by: EMERGENCY MEDICINE

## 2023-04-10 PROCEDURE — 81003 URINALYSIS AUTO W/O SCOPE: CPT | Performed by: EMERGENCY MEDICINE

## 2023-04-10 PROCEDURE — 36415 COLL VENOUS BLD VENIPUNCTURE: CPT

## 2023-04-10 PROCEDURE — 99285 EMERGENCY DEPT VISIT HI MDM: CPT

## 2023-04-10 PROCEDURE — 85025 COMPLETE CBC W/AUTO DIFF WBC: CPT

## 2023-04-10 PROCEDURE — 85025 COMPLETE CBC W/AUTO DIFF WBC: CPT | Performed by: EMERGENCY MEDICINE

## 2023-04-10 PROCEDURE — 83690 ASSAY OF LIPASE: CPT

## 2023-04-10 PROCEDURE — 99284 EMERGENCY DEPT VISIT MOD MDM: CPT

## 2023-04-10 PROCEDURE — 80053 COMPREHEN METABOLIC PANEL: CPT

## 2023-04-10 PROCEDURE — 80053 COMPREHEN METABOLIC PANEL: CPT | Performed by: EMERGENCY MEDICINE

## 2023-04-10 RX ORDER — NAPROXEN 500 MG/1
500 TABLET ORAL 2 TIMES DAILY PRN
Qty: 20 TABLET | Refills: 0 | Status: SHIPPED | OUTPATIENT
Start: 2023-04-10 | End: 2023-04-17

## 2023-04-10 RX ORDER — CYCLOBENZAPRINE HCL 10 MG
10 TABLET ORAL 3 TIMES DAILY PRN
Qty: 20 TABLET | Refills: 0 | Status: SHIPPED | OUTPATIENT
Start: 2023-04-10 | End: 2023-04-17

## 2023-04-10 RX ORDER — CIPROFLOXACIN 500 MG/1
500 TABLET, FILM COATED ORAL 2 TIMES DAILY
COMMUNITY

## 2023-04-10 NOTE — ED INITIAL ASSESSMENT (HPI)
Presents with LLQ radiating to left back. Dysuria, and burning to tip of penis. Pt states recently diagnosed khushboo barre virus.

## 2023-04-11 ENCOUNTER — TELEPHONE (OUTPATIENT)
Dept: CASE MANAGEMENT | Facility: HOSPITAL | Age: 38
End: 2023-04-11

## 2023-04-11 NOTE — CM/SW NOTE
Patient calling with questions on his CT scan. \"The doctor said nothing was wrong but the CT scan shows some things\". Per MD note, noting acute found on CT. Recommended patient follow up with PCP. Patient verbalized understanding.

## 2023-04-11 NOTE — DISCHARGE INSTRUCTIONS
Naproxen up to twice per day for pain as needed. You can take the cyclobenzaprine as well, this is a muscle relaxer and can make you drowsy so do not work or drive when taking. Activity as tolerated.

## 2023-04-15 ENCOUNTER — HOSPITAL ENCOUNTER (EMERGENCY)
Age: 38
Discharge: HOME | End: 2023-04-15
Attending: EMERGENCY MEDICINE
Payer: MEDICAID

## 2023-04-15 ENCOUNTER — APPOINTMENT (OUTPATIENT)
Dept: GENERAL RADIOLOGY | Age: 38
End: 2023-04-15
Attending: EMERGENCY MEDICINE
Payer: MEDICAID

## 2023-04-15 VITALS
WEIGHT: 155 LBS | HEIGHT: 71 IN | DIASTOLIC BLOOD PRESSURE: 84 MMHG | BODY MASS INDEX: 21.7 KG/M2 | SYSTOLIC BLOOD PRESSURE: 116 MMHG | TEMPERATURE: 98 F | RESPIRATION RATE: 18 BRPM | OXYGEN SATURATION: 100 % | HEART RATE: 81 BPM

## 2023-04-15 DIAGNOSIS — R07.9 CHEST PAIN OF UNCERTAIN ETIOLOGY: Primary | ICD-10-CM

## 2023-04-15 LAB
ALBUMIN SERPL-MCNC: 3.9 G/DL (ref 3.4–5)
ALBUMIN/GLOB SERPL: 1.2 {RATIO} (ref 1–2)
ALP LIVER SERPL-CCNC: 47 U/L
ALT SERPL-CCNC: 23 U/L
ANION GAP SERPL CALC-SCNC: 3 MMOL/L (ref 0–18)
AST SERPL-CCNC: 13 U/L (ref 15–37)
ATRIAL RATE: 74 BPM
BASOPHILS # BLD AUTO: 0.03 X10(3) UL (ref 0–0.2)
BASOPHILS NFR BLD AUTO: 0.5 %
BILIRUB SERPL-MCNC: 0.5 MG/DL (ref 0.1–2)
BILIRUB UR QL STRIP.AUTO: NEGATIVE
BUN BLD-MCNC: 11 MG/DL (ref 7–18)
CALCIUM BLD-MCNC: 8.7 MG/DL (ref 8.5–10.1)
CHLORIDE SERPL-SCNC: 107 MMOL/L (ref 98–112)
CLARITY UR REFRACT.AUTO: CLEAR
CO2 SERPL-SCNC: 28 MMOL/L (ref 21–32)
COLOR UR AUTO: YELLOW
CREAT BLD-MCNC: 0.86 MG/DL
D DIMER PPP FEU-MCNC: <0.27 UG/ML FEU (ref ?–0.5)
EOSINOPHIL # BLD AUTO: 0.16 X10(3) UL (ref 0–0.7)
EOSINOPHIL NFR BLD AUTO: 2.5 %
ERYTHROCYTE [DISTWIDTH] IN BLOOD BY AUTOMATED COUNT: 11.8 %
GFR SERPLBLD BASED ON 1.73 SQ M-ARVRAT: 113 ML/MIN/1.73M2 (ref 60–?)
GLOBULIN PLAS-MCNC: 3.3 G/DL (ref 2.8–4.4)
GLUCOSE BLD-MCNC: 113 MG/DL (ref 70–99)
GLUCOSE UR STRIP.AUTO-MCNC: NEGATIVE MG/DL
HCT VFR BLD AUTO: 41.7 %
HGB BLD-MCNC: 14.2 G/DL
IMM GRANULOCYTES # BLD AUTO: 0.01 X10(3) UL (ref 0–1)
IMM GRANULOCYTES NFR BLD: 0.2 %
KETONES UR STRIP.AUTO-MCNC: NEGATIVE MG/DL
LEUKOCYTE ESTERASE UR QL STRIP.AUTO: NEGATIVE
LYMPHOCYTES # BLD AUTO: 2.03 X10(3) UL (ref 1–4)
LYMPHOCYTES NFR BLD AUTO: 31.5 %
MCH RBC QN AUTO: 31.4 PG (ref 26–34)
MCHC RBC AUTO-ENTMCNC: 34.1 G/DL (ref 31–37)
MCV RBC AUTO: 92.3 FL
MONOCYTES # BLD AUTO: 0.37 X10(3) UL (ref 0.1–1)
MONOCYTES NFR BLD AUTO: 5.7 %
NEUTROPHILS # BLD AUTO: 3.84 X10 (3) UL (ref 1.5–7.7)
NEUTROPHILS # BLD AUTO: 3.84 X10(3) UL (ref 1.5–7.7)
NEUTROPHILS NFR BLD AUTO: 59.6 %
NITRITE UR QL STRIP.AUTO: NEGATIVE
OSMOLALITY SERPL CALC.SUM OF ELEC: 286 MOSM/KG (ref 275–295)
P AXIS: 66 DEGREES
P-R INTERVAL: 146 MS
PH UR STRIP.AUTO: 7 [PH] (ref 5–8)
PLATELET # BLD AUTO: 207 10(3)UL (ref 150–450)
POTASSIUM SERPL-SCNC: 4.3 MMOL/L (ref 3.5–5.1)
PROT SERPL-MCNC: 7.2 G/DL (ref 6.4–8.2)
PROT UR STRIP.AUTO-MCNC: NEGATIVE MG/DL
Q-T INTERVAL: 394 MS
QRS DURATION: 80 MS
QTC CALCULATION (BEZET): 437 MS
R AXIS: -19 DEGREES
RBC # BLD AUTO: 4.52 X10(6)UL
RBC UR QL AUTO: NEGATIVE
SODIUM SERPL-SCNC: 138 MMOL/L (ref 136–145)
SP GR UR STRIP.AUTO: 1.01 (ref 1–1.03)
T AXIS: 43 DEGREES
TROPONIN I HIGH SENSITIVITY: 4 NG/L
UROBILINOGEN UR STRIP.AUTO-MCNC: 0.2 MG/DL
VENTRICULAR RATE: 74 BPM
WBC # BLD AUTO: 6.4 X10(3) UL (ref 4–11)

## 2023-04-15 PROCEDURE — 71045 X-RAY EXAM CHEST 1 VIEW: CPT | Performed by: EMERGENCY MEDICINE

## 2023-04-15 PROCEDURE — 93010 ELECTROCARDIOGRAM REPORT: CPT

## 2023-04-15 PROCEDURE — 85025 COMPLETE CBC W/AUTO DIFF WBC: CPT | Performed by: EMERGENCY MEDICINE

## 2023-04-15 PROCEDURE — 93005 ELECTROCARDIOGRAM TRACING: CPT

## 2023-04-15 PROCEDURE — 80053 COMPREHEN METABOLIC PANEL: CPT | Performed by: EMERGENCY MEDICINE

## 2023-04-15 PROCEDURE — 85379 FIBRIN DEGRADATION QUANT: CPT | Performed by: EMERGENCY MEDICINE

## 2023-04-15 PROCEDURE — 84484 ASSAY OF TROPONIN QUANT: CPT | Performed by: EMERGENCY MEDICINE

## 2023-04-15 PROCEDURE — 96374 THER/PROPH/DIAG INJ IV PUSH: CPT

## 2023-04-15 PROCEDURE — 99284 EMERGENCY DEPT VISIT MOD MDM: CPT

## 2023-04-15 PROCEDURE — 81003 URINALYSIS AUTO W/O SCOPE: CPT | Performed by: EMERGENCY MEDICINE

## 2023-04-15 PROCEDURE — 99285 EMERGENCY DEPT VISIT HI MDM: CPT

## 2023-04-15 NOTE — ED INITIAL ASSESSMENT (HPI)
C/o on and off chest pain x few weeks. C/o left flank back x few weeks. Was seen at [facility]. Had CT. was seen by urologist 4/10. Mild cough. Was diagnosed with mono first week of March.

## 2023-04-15 NOTE — DISCHARGE INSTRUCTIONS
Please follow-up with your primary care doctor in the next 2 to 3 days for reassessment. Return for new or worsening pain, difficulty breathing, or any concerning symptoms.

## 2023-05-08 ENCOUNTER — HOSPITAL ENCOUNTER (EMERGENCY)
Facility: HOSPITAL | Age: 38
Discharge: HOME | End: 2023-05-08
Attending: EMERGENCY MEDICINE
Payer: MEDICAID

## 2023-05-08 VITALS
OXYGEN SATURATION: 99 % | SYSTOLIC BLOOD PRESSURE: 111 MMHG | HEART RATE: 68 BPM | DIASTOLIC BLOOD PRESSURE: 71 MMHG | TEMPERATURE: 98 F | RESPIRATION RATE: 18 BRPM | BODY MASS INDEX: 21.7 KG/M2 | WEIGHT: 155 LBS | HEIGHT: 71 IN

## 2023-05-08 DIAGNOSIS — N48.89 PENILE IRRITATION: ICD-10-CM

## 2023-05-08 DIAGNOSIS — R30.0 DYSURIA: Primary | ICD-10-CM

## 2023-05-08 LAB
ALBUMIN SERPL-MCNC: 4 G/DL (ref 3.4–5)
ALBUMIN/GLOB SERPL: 1.2 {RATIO} (ref 1–2)
ALP LIVER SERPL-CCNC: 47 U/L
ALT SERPL-CCNC: 24 U/L
ANION GAP SERPL CALC-SCNC: 2 MMOL/L (ref 0–18)
AST SERPL-CCNC: 17 U/L (ref 15–37)
BASOPHILS # BLD AUTO: 0.02 X10(3) UL (ref 0–0.2)
BASOPHILS NFR BLD AUTO: 0.3 %
BILIRUB SERPL-MCNC: 0.6 MG/DL (ref 0.1–2)
BILIRUB UR QL STRIP.AUTO: NEGATIVE
BUN BLD-MCNC: 16 MG/DL (ref 7–18)
CALCIUM BLD-MCNC: 9.5 MG/DL (ref 8.5–10.1)
CHLORIDE SERPL-SCNC: 107 MMOL/L (ref 98–112)
CLARITY UR REFRACT.AUTO: CLEAR
CO2 SERPL-SCNC: 29 MMOL/L (ref 21–32)
COLOR UR AUTO: YELLOW
CREAT BLD-MCNC: 0.86 MG/DL
EOSINOPHIL # BLD AUTO: 0.21 X10(3) UL (ref 0–0.7)
EOSINOPHIL NFR BLD AUTO: 3.5 %
ERYTHROCYTE [DISTWIDTH] IN BLOOD BY AUTOMATED COUNT: 11.9 %
GFR SERPLBLD BASED ON 1.73 SQ M-ARVRAT: 113 ML/MIN/1.73M2 (ref 60–?)
GLOBULIN PLAS-MCNC: 3.4 G/DL (ref 2.8–4.4)
GLUCOSE BLD-MCNC: 101 MG/DL (ref 70–99)
GLUCOSE UR STRIP.AUTO-MCNC: NEGATIVE MG/DL
HCT VFR BLD AUTO: 42.7 %
HGB BLD-MCNC: 14.3 G/DL
IMM GRANULOCYTES # BLD AUTO: 0.01 X10(3) UL (ref 0–1)
IMM GRANULOCYTES NFR BLD: 0.2 %
KETONES UR STRIP.AUTO-MCNC: NEGATIVE MG/DL
LEUKOCYTE ESTERASE UR QL STRIP.AUTO: NEGATIVE
LYMPHOCYTES # BLD AUTO: 2.26 X10(3) UL (ref 1–4)
LYMPHOCYTES NFR BLD AUTO: 37.7 %
MCH RBC QN AUTO: 31.2 PG (ref 26–34)
MCHC RBC AUTO-ENTMCNC: 33.5 G/DL (ref 31–37)
MCV RBC AUTO: 93.2 FL
MONOCYTES # BLD AUTO: 0.52 X10(3) UL (ref 0.1–1)
MONOCYTES NFR BLD AUTO: 8.7 %
NEUTROPHILS # BLD AUTO: 2.98 X10 (3) UL (ref 1.5–7.7)
NEUTROPHILS # BLD AUTO: 2.98 X10(3) UL (ref 1.5–7.7)
NEUTROPHILS NFR BLD AUTO: 49.6 %
NITRITE UR QL STRIP.AUTO: NEGATIVE
OSMOLALITY SERPL CALC.SUM OF ELEC: 287 MOSM/KG (ref 275–295)
PH UR STRIP.AUTO: 8 [PH] (ref 5–8)
PLATELET # BLD AUTO: 209 10(3)UL (ref 150–450)
POTASSIUM SERPL-SCNC: 5 MMOL/L (ref 3.5–5.1)
PROT SERPL-MCNC: 7.4 G/DL (ref 6.4–8.2)
PROT UR STRIP.AUTO-MCNC: NEGATIVE MG/DL
RBC # BLD AUTO: 4.58 X10(6)UL
RBC UR QL AUTO: NEGATIVE
SODIUM SERPL-SCNC: 138 MMOL/L (ref 136–145)
SP GR UR STRIP.AUTO: 1 (ref 1–1.03)
T PALLIDUM AB SER QL IA: NONREACTIVE
UROBILINOGEN UR STRIP.AUTO-MCNC: <2 MG/DL
WBC # BLD AUTO: 6 X10(3) UL (ref 4–11)

## 2023-05-08 PROCEDURE — 86780 TREPONEMA PALLIDUM: CPT | Performed by: EMERGENCY MEDICINE

## 2023-05-08 PROCEDURE — 87491 CHLMYD TRACH DNA AMP PROBE: CPT | Performed by: EMERGENCY MEDICINE

## 2023-05-08 PROCEDURE — 99284 EMERGENCY DEPT VISIT MOD MDM: CPT

## 2023-05-08 PROCEDURE — 99283 EMERGENCY DEPT VISIT LOW MDM: CPT

## 2023-05-08 PROCEDURE — 85025 COMPLETE CBC W/AUTO DIFF WBC: CPT | Performed by: EMERGENCY MEDICINE

## 2023-05-08 PROCEDURE — 87591 N.GONORRHOEAE DNA AMP PROB: CPT | Performed by: EMERGENCY MEDICINE

## 2023-05-08 PROCEDURE — 80053 COMPREHEN METABOLIC PANEL: CPT | Performed by: EMERGENCY MEDICINE

## 2023-05-08 PROCEDURE — 36415 COLL VENOUS BLD VENIPUNCTURE: CPT

## 2023-05-08 PROCEDURE — 81003 URINALYSIS AUTO W/O SCOPE: CPT | Performed by: EMERGENCY MEDICINE

## 2023-05-08 RX ORDER — PHENAZOPYRIDINE HYDROCHLORIDE 200 MG/1
200 TABLET, FILM COATED ORAL 3 TIMES DAILY PRN
Qty: 6 TABLET | Refills: 0 | Status: SHIPPED | OUTPATIENT
Start: 2023-05-08 | End: 2023-05-15

## 2023-05-08 NOTE — DISCHARGE INSTRUCTIONS
Follow-up with your primary care physician, urologist return if increasing pain or discomfort    You were seen in the emergency room in a limited time. There is a possibility that although we do not see any acute process at this present time that things can change with time. Is therefore imperative that you follow-up with primary care physician for close follow-up. If there is any significant progression of your pain  or other symptoms you to return immediately to the emergency room.

## 2023-05-08 NOTE — ED INITIAL ASSESSMENT (HPI)
Patient to ER w/ complaints of dysuria,urinary urgency, & penile pain for a weeks. Scheduled for scope this AM but states the doctor did not show up.

## 2023-05-09 LAB
C TRACH DNA SPEC QL NAA+PROBE: NEGATIVE
N GONORRHOEA DNA SPEC QL NAA+PROBE: NEGATIVE

## 2023-05-16 ENCOUNTER — APPOINTMENT (OUTPATIENT)
Dept: GENERAL RADIOLOGY | Age: 38
End: 2023-05-16
Attending: EMERGENCY MEDICINE
Payer: MEDICAID

## 2023-05-16 ENCOUNTER — HOSPITAL ENCOUNTER (EMERGENCY)
Age: 38
Discharge: HOME | End: 2023-05-16
Attending: EMERGENCY MEDICINE
Payer: MEDICAID

## 2023-05-16 VITALS
WEIGHT: 155 LBS | SYSTOLIC BLOOD PRESSURE: 115 MMHG | HEIGHT: 71 IN | OXYGEN SATURATION: 97 % | HEART RATE: 103 BPM | TEMPERATURE: 98 F | RESPIRATION RATE: 17 BRPM | DIASTOLIC BLOOD PRESSURE: 70 MMHG | BODY MASS INDEX: 21.7 KG/M2

## 2023-05-16 DIAGNOSIS — R07.89 CHEST PAIN, NON-CARDIAC: Primary | ICD-10-CM

## 2023-05-16 DIAGNOSIS — R10.9 FLANK PAIN: ICD-10-CM

## 2023-05-16 LAB
ALBUMIN SERPL-MCNC: 4.1 G/DL (ref 3.4–5)
ALBUMIN/GLOB SERPL: 1.1 {RATIO} (ref 1–2)
ALP LIVER SERPL-CCNC: 56 U/L
ALT SERPL-CCNC: 22 U/L
ANION GAP SERPL CALC-SCNC: 3 MMOL/L (ref 0–18)
AST SERPL-CCNC: 14 U/L (ref 15–37)
BASOPHILS # BLD AUTO: 0.05 X10(3) UL (ref 0–0.2)
BASOPHILS NFR BLD AUTO: 0.7 %
BILIRUB SERPL-MCNC: 0.4 MG/DL (ref 0.1–2)
BILIRUB UR QL STRIP.AUTO: NEGATIVE
BUN BLD-MCNC: 12 MG/DL (ref 7–18)
CALCIUM BLD-MCNC: 9.4 MG/DL (ref 8.5–10.1)
CHLORIDE SERPL-SCNC: 104 MMOL/L (ref 98–112)
CLARITY UR REFRACT.AUTO: CLEAR
CO2 SERPL-SCNC: 28 MMOL/L (ref 21–32)
COLOR UR AUTO: YELLOW
CREAT BLD-MCNC: 0.94 MG/DL
EOSINOPHIL # BLD AUTO: 0.22 X10(3) UL (ref 0–0.7)
EOSINOPHIL NFR BLD AUTO: 2.9 %
ERYTHROCYTE [DISTWIDTH] IN BLOOD BY AUTOMATED COUNT: 12.2 %
GFR SERPLBLD BASED ON 1.73 SQ M-ARVRAT: 106 ML/MIN/1.73M2 (ref 60–?)
GLOBULIN PLAS-MCNC: 3.8 G/DL (ref 2.8–4.4)
GLUCOSE BLD-MCNC: 98 MG/DL (ref 70–99)
GLUCOSE UR STRIP.AUTO-MCNC: NEGATIVE MG/DL
HCT VFR BLD AUTO: 41.8 %
HGB BLD-MCNC: 14.4 G/DL
IMM GRANULOCYTES # BLD AUTO: 0.02 X10(3) UL (ref 0–1)
IMM GRANULOCYTES NFR BLD: 0.3 %
KETONES UR STRIP.AUTO-MCNC: NEGATIVE MG/DL
LEUKOCYTE ESTERASE UR QL STRIP.AUTO: NEGATIVE
LIPASE SERPL-CCNC: 31 U/L (ref 13–75)
LYMPHOCYTES # BLD AUTO: 3 X10(3) UL (ref 1–4)
LYMPHOCYTES NFR BLD AUTO: 39.7 %
MCH RBC QN AUTO: 31.9 PG (ref 26–34)
MCHC RBC AUTO-ENTMCNC: 34.4 G/DL (ref 31–37)
MCV RBC AUTO: 92.5 FL
MONOCYTES # BLD AUTO: 0.67 X10(3) UL (ref 0.1–1)
MONOCYTES NFR BLD AUTO: 8.9 %
NEUTROPHILS # BLD AUTO: 3.59 X10 (3) UL (ref 1.5–7.7)
NEUTROPHILS # BLD AUTO: 3.59 X10(3) UL (ref 1.5–7.7)
NEUTROPHILS NFR BLD AUTO: 47.5 %
NITRITE UR QL STRIP.AUTO: NEGATIVE
OSMOLALITY SERPL CALC.SUM OF ELEC: 280 MOSM/KG (ref 275–295)
PH UR STRIP.AUTO: 5.5 [PH] (ref 5–8)
PLATELET # BLD AUTO: 209 10(3)UL (ref 150–450)
POTASSIUM SERPL-SCNC: 3.9 MMOL/L (ref 3.5–5.1)
PROT SERPL-MCNC: 7.9 G/DL (ref 6.4–8.2)
PROT UR STRIP.AUTO-MCNC: NEGATIVE MG/DL
RBC # BLD AUTO: 4.52 X10(6)UL
RBC UR QL AUTO: NEGATIVE
SARS-COV-2 RNA RESP QL NAA+PROBE: NOT DETECTED
SODIUM SERPL-SCNC: 135 MMOL/L (ref 136–145)
SP GR UR STRIP.AUTO: <=1.005 (ref 1–1.03)
TROPONIN I HIGH SENSITIVITY: 4 NG/L
UROBILINOGEN UR STRIP.AUTO-MCNC: 0.2 MG/DL
WBC # BLD AUTO: 7.6 X10(3) UL (ref 4–11)

## 2023-05-16 PROCEDURE — 93005 ELECTROCARDIOGRAM TRACING: CPT

## 2023-05-16 PROCEDURE — 93010 ELECTROCARDIOGRAM REPORT: CPT

## 2023-05-16 PROCEDURE — 71045 X-RAY EXAM CHEST 1 VIEW: CPT | Performed by: EMERGENCY MEDICINE

## 2023-05-16 PROCEDURE — 99285 EMERGENCY DEPT VISIT HI MDM: CPT

## 2023-05-16 PROCEDURE — 99284 EMERGENCY DEPT VISIT MOD MDM: CPT

## 2023-05-16 PROCEDURE — 80053 COMPREHEN METABOLIC PANEL: CPT | Performed by: EMERGENCY MEDICINE

## 2023-05-16 PROCEDURE — 36415 COLL VENOUS BLD VENIPUNCTURE: CPT

## 2023-05-16 PROCEDURE — 85025 COMPLETE CBC W/AUTO DIFF WBC: CPT | Performed by: EMERGENCY MEDICINE

## 2023-05-16 PROCEDURE — 84484 ASSAY OF TROPONIN QUANT: CPT | Performed by: EMERGENCY MEDICINE

## 2023-05-16 PROCEDURE — 81003 URINALYSIS AUTO W/O SCOPE: CPT | Performed by: EMERGENCY MEDICINE

## 2023-05-16 PROCEDURE — 83690 ASSAY OF LIPASE: CPT | Performed by: EMERGENCY MEDICINE

## 2023-05-16 NOTE — ED INITIAL ASSESSMENT (HPI)
Pt states for 10 weeks having fever, feels like abd is inflammed and chest tightness. also feels confused and something in right eye Denies

## 2023-05-17 LAB
ATRIAL RATE: 97 BPM
P AXIS: 63 DEGREES
P-R INTERVAL: 140 MS
Q-T INTERVAL: 332 MS
QRS DURATION: 76 MS
QTC CALCULATION (BEZET): 421 MS
R AXIS: -26 DEGREES
T AXIS: 46 DEGREES
VENTRICULAR RATE: 97 BPM

## 2023-05-17 NOTE — DISCHARGE INSTRUCTIONS
I recommend you continue follow-up with your primary care doctor and scheduled gastroenterology work-up for further evaluation of the symptoms.   If these symptoms persist, going forward other considerations would include a rheumatologic work-up with a specialist.

## 2023-06-13 ENCOUNTER — APPOINTMENT (OUTPATIENT)
Dept: GENERAL RADIOLOGY | Facility: HOSPITAL | Age: 38
End: 2023-06-13
Attending: EMERGENCY MEDICINE
Payer: MEDICAID

## 2023-06-13 ENCOUNTER — HOSPITAL ENCOUNTER (EMERGENCY)
Facility: HOSPITAL | Age: 38
Discharge: HOME | End: 2023-06-13
Attending: EMERGENCY MEDICINE
Payer: MEDICAID

## 2023-06-13 VITALS
OXYGEN SATURATION: 99 % | BODY MASS INDEX: 22.4 KG/M2 | HEART RATE: 65 BPM | RESPIRATION RATE: 15 BRPM | WEIGHT: 160 LBS | SYSTOLIC BLOOD PRESSURE: 109 MMHG | DIASTOLIC BLOOD PRESSURE: 75 MMHG | TEMPERATURE: 98 F | HEIGHT: 71 IN

## 2023-06-13 DIAGNOSIS — G89.29 CHRONIC ABDOMINAL PAIN: ICD-10-CM

## 2023-06-13 DIAGNOSIS — R10.9 CHRONIC ABDOMINAL PAIN: ICD-10-CM

## 2023-06-13 DIAGNOSIS — R00.0 TACHYCARDIA: Primary | ICD-10-CM

## 2023-06-13 LAB
ALBUMIN SERPL-MCNC: 4.3 G/DL (ref 3.4–5)
ALBUMIN/GLOB SERPL: 1 {RATIO} (ref 1–2)
ALP LIVER SERPL-CCNC: 50 U/L
ALT SERPL-CCNC: 18 U/L
ANION GAP SERPL CALC-SCNC: 5 MMOL/L (ref 0–18)
AST SERPL-CCNC: 9 U/L (ref 15–37)
BASOPHILS # BLD AUTO: 0.02 X10(3) UL (ref 0–0.2)
BASOPHILS NFR BLD AUTO: 0.2 %
BILIRUB SERPL-MCNC: 0.4 MG/DL (ref 0.1–2)
BUN BLD-MCNC: 11 MG/DL (ref 7–18)
CALCIUM BLD-MCNC: 9.4 MG/DL (ref 8.5–10.1)
CHLORIDE SERPL-SCNC: 106 MMOL/L (ref 98–112)
CO2 SERPL-SCNC: 30 MMOL/L (ref 21–32)
CREAT BLD-MCNC: 1.23 MG/DL
D DIMER PPP FEU-MCNC: <0.27 UG/ML FEU (ref ?–0.5)
EOSINOPHIL # BLD AUTO: 0 X10(3) UL (ref 0–0.7)
EOSINOPHIL NFR BLD AUTO: 0 %
ERYTHROCYTE [DISTWIDTH] IN BLOOD BY AUTOMATED COUNT: 12.2 %
GFR SERPLBLD BASED ON 1.73 SQ M-ARVRAT: 77 ML/MIN/1.73M2 (ref 60–?)
GLOBULIN PLAS-MCNC: 4.1 G/DL (ref 2.8–4.4)
GLUCOSE BLD-MCNC: 167 MG/DL (ref 70–99)
HCT VFR BLD AUTO: 45.8 %
HGB BLD-MCNC: 15.2 G/DL
IMM GRANULOCYTES # BLD AUTO: 0.03 X10(3) UL (ref 0–1)
IMM GRANULOCYTES NFR BLD: 0.3 %
LYMPHOCYTES # BLD AUTO: 1.1 X10(3) UL (ref 1–4)
LYMPHOCYTES NFR BLD AUTO: 11.8 %
MCH RBC QN AUTO: 31.3 PG (ref 26–34)
MCHC RBC AUTO-ENTMCNC: 33.2 G/DL (ref 31–37)
MCV RBC AUTO: 94.4 FL
MONOCYTES # BLD AUTO: 0.53 X10(3) UL (ref 0.1–1)
MONOCYTES NFR BLD AUTO: 5.7 %
NEUTROPHILS # BLD AUTO: 7.61 X10 (3) UL (ref 1.5–7.7)
NEUTROPHILS # BLD AUTO: 7.61 X10(3) UL (ref 1.5–7.7)
NEUTROPHILS NFR BLD AUTO: 82 %
OSMOLALITY SERPL CALC.SUM OF ELEC: 295 MOSM/KG (ref 275–295)
PLATELET # BLD AUTO: 226 10(3)UL (ref 150–450)
POTASSIUM SERPL-SCNC: 3.9 MMOL/L (ref 3.5–5.1)
PROT SERPL-MCNC: 8.4 G/DL (ref 6.4–8.2)
RBC # BLD AUTO: 4.85 X10(6)UL
SODIUM SERPL-SCNC: 141 MMOL/L (ref 136–145)
TROPONIN I HIGH SENSITIVITY: <3 NG/L
TSI SER-ACNC: 0.7 MIU/ML (ref 0.36–3.74)
WBC # BLD AUTO: 9.3 X10(3) UL (ref 4–11)

## 2023-06-13 PROCEDURE — 85379 FIBRIN DEGRADATION QUANT: CPT | Performed by: EMERGENCY MEDICINE

## 2023-06-13 PROCEDURE — 84443 ASSAY THYROID STIM HORMONE: CPT | Performed by: EMERGENCY MEDICINE

## 2023-06-13 PROCEDURE — 80053 COMPREHEN METABOLIC PANEL: CPT | Performed by: EMERGENCY MEDICINE

## 2023-06-13 PROCEDURE — 36415 COLL VENOUS BLD VENIPUNCTURE: CPT

## 2023-06-13 PROCEDURE — 84484 ASSAY OF TROPONIN QUANT: CPT | Performed by: EMERGENCY MEDICINE

## 2023-06-13 PROCEDURE — 99284 EMERGENCY DEPT VISIT MOD MDM: CPT

## 2023-06-13 PROCEDURE — 84484 ASSAY OF TROPONIN QUANT: CPT

## 2023-06-13 PROCEDURE — 71045 X-RAY EXAM CHEST 1 VIEW: CPT | Performed by: EMERGENCY MEDICINE

## 2023-06-13 PROCEDURE — 85025 COMPLETE CBC W/AUTO DIFF WBC: CPT

## 2023-06-13 PROCEDURE — 93005 ELECTROCARDIOGRAM TRACING: CPT

## 2023-06-13 PROCEDURE — 85025 COMPLETE CBC W/AUTO DIFF WBC: CPT | Performed by: EMERGENCY MEDICINE

## 2023-06-13 PROCEDURE — 93010 ELECTROCARDIOGRAM REPORT: CPT

## 2023-06-13 PROCEDURE — 80053 COMPREHEN METABOLIC PANEL: CPT

## 2023-06-13 PROCEDURE — 99285 EMERGENCY DEPT VISIT HI MDM: CPT

## 2023-06-13 RX ORDER — PREDNISONE 20 MG/1
20 TABLET ORAL DAILY
COMMUNITY

## 2023-06-14 LAB
ATRIAL RATE: 102 BPM
P AXIS: 76 DEGREES
P-R INTERVAL: 138 MS
Q-T INTERVAL: 330 MS
QRS DURATION: 74 MS
QTC CALCULATION (BEZET): 430 MS
R AXIS: -10 DEGREES
T AXIS: 58 DEGREES
VENTRICULAR RATE: 102 BPM

## 2023-06-14 NOTE — ED INITIAL ASSESSMENT (HPI)
C/o chest tightness x 5 weeks. \" rapid heart beat\" x 2-3 weeks, . States \" by heart rate is over 100 when I am sitting\" . Saw Dr Ashley Zayas on 6/3. Left flank pain radiating to the back x 12 weeks and head tightness.  Recent diagnosis of elevated IgG 4

## 2023-07-03 ENCOUNTER — HOSPITAL ENCOUNTER (OUTPATIENT)
Dept: NUCLEAR MEDICINE | Facility: HOSPITAL | Age: 38
Discharge: HOME | End: 2023-07-03
Attending: FAMILY MEDICINE
Payer: MEDICAID

## 2023-07-03 DIAGNOSIS — B27.90 CHRONIC EPSTEIN BARR VIRUS (EBV) INFECTION: ICD-10-CM

## 2023-07-03 LAB — GLUCOSE BLD-MCNC: 132 MG/DL (ref 70–99)

## 2023-07-03 PROCEDURE — 78815 PET IMAGE W/CT SKULL-THIGH: CPT | Performed by: FAMILY MEDICINE

## 2023-07-03 PROCEDURE — 82962 GLUCOSE BLOOD TEST: CPT

## 2023-12-04 ENCOUNTER — HOSPITAL ENCOUNTER (EMERGENCY)
Age: 38
Discharge: HOME | End: 2023-12-04

## 2023-12-04 VITALS
BODY MASS INDEX: 25.9 KG/M2 | HEART RATE: 87 BPM | RESPIRATION RATE: 16 BRPM | HEIGHT: 71 IN | TEMPERATURE: 99 F | DIASTOLIC BLOOD PRESSURE: 71 MMHG | OXYGEN SATURATION: 97 % | SYSTOLIC BLOOD PRESSURE: 123 MMHG | WEIGHT: 185 LBS

## 2023-12-04 DIAGNOSIS — J20.9 ACUTE BRONCHITIS, UNSPECIFIED ORGANISM: Primary | ICD-10-CM

## 2023-12-04 LAB
POCT INFLUENZA A: NEGATIVE
POCT INFLUENZA B: NEGATIVE
SARS-COV-2 RNA RESP QL NAA+PROBE: NOT DETECTED

## 2023-12-04 PROCEDURE — 99284 EMERGENCY DEPT VISIT MOD MDM: CPT

## 2023-12-04 PROCEDURE — 87430 STREP A AG IA: CPT

## 2023-12-04 PROCEDURE — 99283 EMERGENCY DEPT VISIT LOW MDM: CPT

## 2023-12-04 PROCEDURE — 87502 INFLUENZA DNA AMP PROBE: CPT

## 2023-12-04 RX ORDER — ALBUTEROL SULFATE 90 UG/1
2 AEROSOL, METERED RESPIRATORY (INHALATION) EVERY 4 HOURS PRN
Qty: 1 EACH | Refills: 0 | Status: SHIPPED | OUTPATIENT
Start: 2023-12-04 | End: 2024-01-03

## 2023-12-04 RX ORDER — PSEUDOEPHEDRINE HCL 30 MG
30 TABLET ORAL EVERY 4 HOURS PRN
Qty: 36 TABLET | Refills: 0 | Status: SHIPPED | OUTPATIENT
Start: 2023-12-04 | End: 2024-01-03

## 2023-12-04 RX ORDER — OXYMETAZOLINE HYDROCHLORIDE 0.05 G/100ML
1 SPRAY NASAL EVERY 4 HOURS PRN
Qty: 15 ML | Refills: 0 | Status: SHIPPED | OUTPATIENT
Start: 2023-12-04 | End: 2024-01-03

## 2023-12-04 RX ORDER — IBUPROFEN 600 MG/1
600 TABLET ORAL ONCE
Status: COMPLETED | OUTPATIENT
Start: 2023-12-04 | End: 2023-12-04

## 2023-12-04 RX ORDER — BENZONATATE 100 MG/1
100 CAPSULE ORAL 3 TIMES DAILY PRN
Qty: 30 CAPSULE | Refills: 0 | Status: SHIPPED | OUTPATIENT
Start: 2023-12-04 | End: 2024-01-03

## 2024-01-02 ENCOUNTER — OFFICE VISIT (OUTPATIENT)
Dept: FAMILY MEDICINE CLINIC | Facility: CLINIC | Age: 39
End: 2024-01-02
Payer: MEDICAID

## 2024-01-02 VITALS
SYSTOLIC BLOOD PRESSURE: 112 MMHG | BODY MASS INDEX: 26.6 KG/M2 | DIASTOLIC BLOOD PRESSURE: 66 MMHG | HEIGHT: 71 IN | OXYGEN SATURATION: 99 % | TEMPERATURE: 98 F | WEIGHT: 190 LBS | HEART RATE: 75 BPM | RESPIRATION RATE: 18 BRPM

## 2024-01-02 DIAGNOSIS — R52 BODY ACHES: ICD-10-CM

## 2024-01-02 DIAGNOSIS — R53.83 FATIGUE, UNSPECIFIED TYPE: Primary | ICD-10-CM

## 2024-01-02 DIAGNOSIS — R79.9 ABNORMAL BLOOD CHEMISTRY: ICD-10-CM

## 2024-01-02 PROBLEM — K04.7 DENTAL ABSCESS: Status: ACTIVE | Noted: 2024-01-02

## 2024-01-02 PROBLEM — R07.9 CHEST PAIN: Status: ACTIVE | Noted: 2020-07-21

## 2024-01-02 PROBLEM — G25.0 TREMOR, ESSENTIAL: Status: ACTIVE | Noted: 2023-06-28

## 2024-01-02 PROBLEM — E53.8 COBALAMIN DEFICIENCY: Status: ACTIVE | Noted: 2023-06-29

## 2024-01-02 PROBLEM — M54.42 CHRONIC LEFT-SIDED LOW BACK PAIN WITH LEFT-SIDED SCIATICA: Status: ACTIVE | Noted: 2018-08-22

## 2024-01-02 PROBLEM — J06.9 UPPER RESPIRATORY INFECTION: Status: ACTIVE | Noted: 2024-01-02

## 2024-01-02 PROBLEM — K21.9 GASTROESOPHAGEAL REFLUX DISEASE: Status: ACTIVE | Noted: 2018-08-22

## 2024-01-02 PROBLEM — M25.552 LEFT HIP PAIN: Status: ACTIVE | Noted: 2018-08-22

## 2024-01-02 PROBLEM — R06.09 CHRONIC DYSPNEA: Status: ACTIVE | Noted: 2022-08-05

## 2024-01-02 PROBLEM — R00.2 PALPITATIONS: Status: ACTIVE | Noted: 2020-07-21

## 2024-01-02 PROBLEM — G44.209 TENSION TYPE HEADACHE: Status: ACTIVE | Noted: 2023-06-28

## 2024-01-02 PROBLEM — M79.661 PAIN IN BOTH LOWER LEGS: Status: ACTIVE | Noted: 2023-06-28

## 2024-01-02 PROBLEM — R07.2 PRECORDIAL PAIN: Status: ACTIVE | Noted: 2018-08-10

## 2024-01-02 PROBLEM — R20.0 NUMBNESS AND TINGLING OF LEFT UPPER EXTREMITY: Status: ACTIVE | Noted: 2022-08-05

## 2024-01-02 PROBLEM — R41.3 COMPLAINTS OF MEMORY DISTURBANCE: Status: ACTIVE | Noted: 2023-06-28

## 2024-01-02 PROBLEM — Q24.5 MYOCARDIAL BRIDGE: Status: ACTIVE | Noted: 2020-07-21

## 2024-01-02 PROBLEM — I34.1 MITRAL VALVE PROLAPSE: Status: ACTIVE | Noted: 2024-01-02

## 2024-01-02 PROBLEM — R06.02 SOB (SHORTNESS OF BREATH): Status: ACTIVE | Noted: 2022-08-04

## 2024-01-02 PROBLEM — R07.89 PRESSURE IN CHEST: Status: ACTIVE | Noted: 2022-08-05

## 2024-01-02 PROBLEM — Q24.5 MYOCARDIAL BRIDGE (HCC): Status: ACTIVE | Noted: 2020-07-21

## 2024-01-02 PROBLEM — R20.2 NUMBNESS AND TINGLING IN RIGHT HAND: Status: ACTIVE | Noted: 2022-08-05

## 2024-01-02 PROBLEM — M25.562 ACUTE PAIN OF LEFT KNEE: Status: ACTIVE | Noted: 2018-08-22

## 2024-01-02 PROBLEM — M79.662 PAIN IN BOTH LOWER LEGS: Status: ACTIVE | Noted: 2023-06-28

## 2024-01-02 PROBLEM — R20.0 NUMBNESS AND TINGLING IN RIGHT HAND: Status: ACTIVE | Noted: 2022-08-05

## 2024-01-02 PROBLEM — G89.29 CHRONIC LEFT-SIDED LOW BACK PAIN WITH LEFT-SIDED SCIATICA: Status: ACTIVE | Noted: 2018-08-22

## 2024-01-02 PROBLEM — R20.2 NUMBNESS AND TINGLING OF LEFT UPPER EXTREMITY: Status: ACTIVE | Noted: 2022-08-05

## 2024-01-02 PROBLEM — G40.A09 ABSENCE SEIZURE (HCC): Status: ACTIVE | Noted: 2023-06-29

## 2024-01-02 PROCEDURE — 99203 OFFICE O/P NEW LOW 30 MIN: CPT | Performed by: FAMILY MEDICINE

## 2024-01-02 PROCEDURE — 3074F SYST BP LT 130 MM HG: CPT | Performed by: FAMILY MEDICINE

## 2024-01-02 PROCEDURE — 3008F BODY MASS INDEX DOCD: CPT | Performed by: FAMILY MEDICINE

## 2024-01-02 PROCEDURE — 3078F DIAST BP <80 MM HG: CPT | Performed by: FAMILY MEDICINE

## 2024-01-02 NOTE — PROGRESS NOTES
The 21st Century Cures Act makes medical notes like these available to patients in the interest of transparency. Please be advised this is a medical document. Medical documents are intended to carry relevant information, facts as evident, and the clinical opinion of the practitioner. The medical note is intended as peer to peer communication and may appear blunt or direct. It is written in medical language and may contain abbreviations or verbiage that are unfamiliar.       Dwight Cardenas is a 38 year old male.    HPI:     Chief Complaint   Patient presents with    Other     Establish Care       This 38-year-old male who is a new patient to this office presents to the office to establish care.  He has an appointment scheduled for his annual physical with Karmen Ellison PA-C but wanted to be seen today for worsening fatigue, body aches and intermittent fevers.    The patient has a very complex history which started in March 2023.  He states he had been running intermittent fevers up to 101 which was associated with left flank pain, left eye and ear discomfort, brain fog and a funny smell in his nose.  He admits to worsening fatigue and he feels like his whole body is inflamed.  He has a burning sensation in his spine and in his joints.  He had been seen by his primary physician and rheumatologist.  He was also seen by an infectious disease doctor in April.  The patient's laboratory testing showed elevation of IgG, subclass 4 at 145 on May 30, 2023.  The patient had normal ESR and CRP on that date.  His parvovirus B19 IgG level was elevated at 5.6 but his IgM was negative.  CMV IgM was negative.  He had mild elevation of cytoplasmic (C-ANCA) at 1: 20.  The remainder of his rheumatologic tests were negative.  His STD testing was negative including HIV.  His most recent laboratory done on 12/22/2023 shows persistent mild elevation of IgG, subclass 4 at 141, ESR is mildly elevated at 19, CRP is normal, CBC is normal,  CMP is normal.  Cytoplasmic (see-ANCA) is elevated at 1: 40. PET scan done on 7/3/2023 was normal.    The patient had been started on methotrexate and initially had a good response but then stopped the methotrexate because he felt weird on it and it was causing swelling in his legs.  The patient had also been on a course of steroids previously.  He is not currently on any medications from the rheumatologist.  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  He states he is able to drive his car without any difficulty.  He sometimes feels he has difficulty with his perception.  He is not having any disequilibrium or falls.  The patient is very frustrated because nobody has given him any answers to how he has been feeling.  The infectious disease doctor told him he had no active viral infection.  He is scheduled to see his rheumatologist tomorrow.  He states the rheumatologist had recommended that the patient be seen at a tertiary center.  He does not know the name of the doctor the rheumatologist wants him to see.  He is here today to establish with a new primary office so he can get a referral.  The patient states he has stopped smoking and drinking any alcohol since all of his symptoms started but they did not improve.    The patient denies feeling depressed or anxious. PHQ score today is 6.    HISTORY:  Past Medical History:   Diagnosis Date    Anxiety     Esophageal reflux     Mitral valve prolapse 1/2/2024    OTHER DISEASES     right pulmonary nodule - was found to be benign     OTHER DISEASES     HX childhood pneumonia      Past Surgical History:   Procedure Laterality Date    ANGIOGRAM      2017 - found to have myocardial bridge     OTHER SURGICAL HISTORY  June 2010 [facility]    lung BX     SHOULDER ARTHROSCOPY        History reviewed. No pertinent family history.   Social History:   Social  History     Socioeconomic History    Marital status:    Tobacco Use    Smoking status: Former     Packs/day: 0.50     Years: 10.00     Additional pack years: 0.00     Total pack years: 5.00     Types: Cigarettes    Smokeless tobacco: Never   Vaping Use    Vaping Use: Never used   Substance and Sexual Activity    Alcohol use: Not Currently     Comment: occasional    Drug use: Never   Other Topics Concern     Service No    Blood Transfusions No    Caffeine Concern No    Occupational Exposure No    Hobby Hazards No    Sleep Concern Yes    Stress Concern No    Weight Concern No    Special Diet No    Back Care No    Exercise Yes    Bike Helmet No    Seat Belt Yes    Self-Exams No        Medications (Active prior to today's visit):  Current Outpatient Medications   Medication Sig Dispense Refill    albuterol 108 (90 Base) MCG/ACT Inhalation Aero Soln Inhale 2 puffs into the lungs every 4 (four) hours as needed for Wheezing. 1 each 0    metoprolol tartrate 25 MG Oral Tab Take 1 tablet (25 mg total) by mouth 2 (two) times daily.      Magnesium Gluconate 250 MG Oral Tab Take 250 mg by mouth daily.      famotidine 20 MG Oral Tab Take 1 tablet (20 mg total) by mouth daily.      benzonatate 100 MG Oral Cap Take 1 capsule (100 mg total) by mouth 3 (three) times daily as needed for cough. (Patient not taking: Reported on 1/2/2024) 30 capsule 0       Allergies:  Allergies   Allergen Reactions    No Known Allergies        ROS:     Pertinent positives and pertinent negatives are as listed in HPI.    All other review of symptoms were reviewed and negative.    PHYSICAL EXAM:   /66   Pulse 75   Temp 98 °F (36.7 °C) (Temporal)   Resp 18   Ht 5' 11\" (1.803 m)   Wt 190 lb (86.2 kg)   SpO2 99%   BMI 26.50 kg/m²     Wt Readings from Last 3 Encounters:   01/02/24 190 lb (86.2 kg)   12/04/23 185 lb (83.9 kg)   06/13/23 160 lb (72.6 kg)       BP Readings from Last 3 Encounters:   01/02/24 112/66   12/04/23 123/71    06/13/23 109/75     Weight is up 30 pounds from his 6/13/2023 office visit.    General: Overweight, well hydrated. No acute distress. No pallor.   HEENT: Normocephalic, atraumatic.  SHAILA, EOMI, Sclera clear and non icteric bilaterally. TM's normal, nose without congestion, pharynx without redness or exudates. Moist mucous membranes.  Neck: Supple. No lymphadenopathy. No thyromegaly. No bruits noted.  Heart: RRR without S3 or S4 or murmur.  Lungs: Clear to auscultation bilaterally. No rales, rhonchi or wheezes. No tachypnea or retractions noted.  Abdomen: Soft, nontender, nondistended, normal bowel sounds. No organomegaly. No guarding, rigidity or rebound noted.  Extremities: No edema bilaterally.  No joint swelling or redness is noted.  Skin: Warm and dry.  Multiple tattoos are noted.  Neuro: Alert and oriented x 3.Cr. N. II-XII intact, normal gait.  Psych: Normal mood and affect.     ASSESSMENT/PLAN:   38 year old male with         1. Fatigue, unspecified type  2. Body aches  3. Abnormal blood chemistry    I reviewed the note from the rheumatologist from June as well as the most recent office note from his PCP. He has had extensive workup.  I have no explanation for his current constellation of symptoms whivh he is currently experiencing.  He has an appointment scheduled with his rheumatologist tomorrow.  I informed him to contact the office once he knows which physician the rheumatologist wants him to see and a referral would be placed.    4.  Health maintenance    Patient is to keep his appointment as scheduled on 1/22/2024 with Karmen Ellison PA-C.         Health Maintenance:    Health Maintenance   Topic Date Due    Annual Physical  Never done    Pneumococcal Vaccine: Birth to 64yrs (1 - PCV) Never done    DTaP,Tdap,and Td Vaccines (1 - Tdap) Never done    Influenza Vaccine (1) 01/02/2025 (Originally 10/1/2023)    COVID-19 Vaccine (3 - 2023-24 season) 01/02/2025 (Originally 9/1/2023)    Annual Depression  Screening  Completed         Meds This Visit:  Requested Prescriptions      No prescriptions requested or ordered in this encounter       Imaging & Referrals:  None     Patient understands plan and follow-up.  FU on 1/22/2024 with Karmen Ellison PA-C for annual wellness exam.    1/2/2024  Halley Montez DO    Total time: 30 minutes including precharting, H&P, plan of care    This dictation was performed with a verbal recognition program (DRAGON) and it was checked for errors. It is possible that there are still dictated errors within this office note. If so, please bring any errors to my attention for an addendum. All efforts were made to ensure that this office note is accurate

## 2024-01-02 NOTE — PATIENT INSTRUCTIONS
Keep your appointment with the rheumatologist as scheduled tomorrow.    Contact the office if he is referring you to another physician. We will need the name of that physician in order to place a referral.    Keep your appointment with Karmen Ellison as scheduled on 1/22/2024 for your annual physical.

## 2024-01-10 ENCOUNTER — OFFICE VISIT (OUTPATIENT)
Dept: FAMILY MEDICINE CLINIC | Facility: CLINIC | Age: 39
End: 2024-01-10
Payer: MEDICAID

## 2024-01-10 VITALS
HEART RATE: 97 BPM | OXYGEN SATURATION: 98 % | WEIGHT: 190 LBS | DIASTOLIC BLOOD PRESSURE: 64 MMHG | TEMPERATURE: 98 F | BODY MASS INDEX: 26.6 KG/M2 | HEIGHT: 71 IN | SYSTOLIC BLOOD PRESSURE: 112 MMHG | RESPIRATION RATE: 16 BRPM

## 2024-01-10 DIAGNOSIS — R41.3 COMPLAINTS OF MEMORY DISTURBANCE: ICD-10-CM

## 2024-01-10 DIAGNOSIS — D22.9 MULTIPLE PIGMENTED NEVI: ICD-10-CM

## 2024-01-10 DIAGNOSIS — R27.8 COORDINATION IMPAIRMENT: ICD-10-CM

## 2024-01-10 DIAGNOSIS — Z00.00 WELL ADULT EXAM: Primary | ICD-10-CM

## 2024-01-10 DIAGNOSIS — M79.2 NEURALGIA: ICD-10-CM

## 2024-01-10 DIAGNOSIS — R23.2 FLUSHING: ICD-10-CM

## 2024-01-10 DIAGNOSIS — R76.8 HIGH TOTAL SERUM IGG: ICD-10-CM

## 2024-01-10 DIAGNOSIS — Z13.220 ENCOUNTER FOR LIPID SCREENING FOR CARDIOVASCULAR DISEASE: ICD-10-CM

## 2024-01-10 DIAGNOSIS — R76.8 ABNORMAL ANCA TEST: ICD-10-CM

## 2024-01-10 DIAGNOSIS — Z87.891 HISTORY OF PRIOR CIGARETTE SMOKING: ICD-10-CM

## 2024-01-10 DIAGNOSIS — Z13.6 ENCOUNTER FOR LIPID SCREENING FOR CARDIOVASCULAR DISEASE: ICD-10-CM

## 2024-01-10 PROBLEM — R60.1 GENERALIZED EDEMA: Status: ACTIVE | Noted: 2022-08-05

## 2024-01-10 PROCEDURE — 3078F DIAST BP <80 MM HG: CPT | Performed by: FAMILY MEDICINE

## 2024-01-10 PROCEDURE — 99395 PREV VISIT EST AGE 18-39: CPT | Performed by: FAMILY MEDICINE

## 2024-01-10 PROCEDURE — 3074F SYST BP LT 130 MM HG: CPT | Performed by: FAMILY MEDICINE

## 2024-01-10 PROCEDURE — 3008F BODY MASS INDEX DOCD: CPT | Performed by: FAMILY MEDICINE

## 2024-01-10 PROCEDURE — 99214 OFFICE O/P EST MOD 30 MIN: CPT | Performed by: FAMILY MEDICINE

## 2024-01-10 RX ORDER — CYANOCOBALAMIN 1000 UG/ML
1000 INJECTION, SOLUTION INTRAMUSCULAR; SUBCUTANEOUS
COMMUNITY

## 2024-01-10 NOTE — PROGRESS NOTES
Dwight Cardenas is a 38 year old male who presents for a complete physical exam.   HPI:   Pt complains of multiple health issues is here for physical and to discuss health issues as well as needing a referral placed for rheumatology.      Patient states that he has been having multiple health issues over the past year has been seeing a rheumatologist and neurologist.  He has had an extensive workup with rheumatology and neurology.  Symptoms started in March 2023 states he would get intermittent chills and then unexplained fevers.  Developed fatigue, burning sensations, left flank pain, brain fog and weakness.  Started experiencing left eye pain and back pain.  Was experiencing also left facial neuralgia, memory issues and coordination issues.  Had to quit his job as a  because he felt like he was leaning while he was driving was worried he would get into an accident.  Feels a warmth in his hands, feet and face always feels like he is flushed.  Has EMG upper and lower extremities and MRI of brain with contrast scheduled with neurology in mid January 2024.  Blood work ordered with neurologist myasthenia panel, CK, thyroid, B12 and immunoelectrophoresis records not available not sure if these were done.  Had blood work done with rheumatology @ [facility] Subramanianammed ,  had ANCA positive and a elevated IgG subclass 4.  Rheumatologist suggested consultation with rheumatologist at [facility] and today he is requesting a referral for that rheumatologist.  He is also contemplating having a spinal tap done with the neurologist [facility] in [place] .Jared Zelaya MD.  Does have a fine tremor that has had since he was a child also had absence seizures when he was a child no adult seizures known.  Infectious disease doctor did not find any reason for his symptoms.    Labs over the past 9 months have included CBC, CMP, CRP, sed rate, IgG, ANCA, ACE, HIV, hepatitis panel, RPR, Lyme's  disease serology, ANCA, complement C3 and C4, CMV, parvo, WALT, CCP,, RF , TSH, protein electrophoresis (5/30/2023), CK, ferritin, cryoglobulin, GC chlamydia    Results have shown normal ANCA until recently 12/2023 elevation in the ANCA and persistent IgG subclass 4 elevation, elevated sed rate 19 with normal CRP.  Rest of the above testing was normal  PET scan done 7/3/2023 normal    Was put on methotrexate stopped because he felt like he was getting swelling also has been on courses of steroids presently not on any medication.    Social history: Prior smoker for 18 years 1 pack/day quit 2/2023 exercise trying to do it but is not consistent due to physical symptoms, no longer working due to health issues over the past year, alcohol quit 2/2023 was 2 times a week, caffeine minimal, no THC or illicits, has a girlfriend and has 3 children lives with his father.  FH CAD or cancer mom lung cancer, maternal grandfather colon cancer, dad quadruple bypass, paternal grandfather quadruple bypass  Surgical history angiogram 2016, left shoulder surgery 2007 bone spur, negative right lung biopsy for suspected mass; negative cystoscopy  Past medical history absent seizures when he was a child; fine tremors    --- Complete physical  Vaccinations defers all vaccinations at this point due to multiple health issues he has been experiencing the past year COVID vaccine x 2 defers further vaccinations; Tdap and influenza defers vaccination  Dental exams needed  Eye exams up-to-date  PHQ2 is at a 0  Sleep Apnea   no snoring witnessed  Exercise  trying to get back to gym very fatigued   DIet has improved this past year trying to avoid processed foods  Smoking history 18 years 1 pack/day quit 2/2023  Alcohol history quit 2/2023 was drinking 2 times a week  Last colonoscopy  normal 2023 endoscopy and colonoscopy 6/2023 MGF colon cancer  Urination changes   recent cystoscopy normal with urology increased urination and 1-2 at night no FH  prostate cancer  ED symptoms  none  Immunizations needed holding off on further vaccinations at this point due to health issues    Depression Screening (PHQ-2/PHQ-9): Over the LAST 2 WEEKS   Little interest or pleasure in doing things: Not at all    Feeling down, depressed, or hopeless: Not at all    PHQ-2 SCORE: 0              Wt Readings from Last 6 Encounters:   01/10/24 190 lb (86.2 kg)   01/02/24 190 lb (86.2 kg)   12/04/23 185 lb (83.9 kg)   06/13/23 160 lb (72.6 kg)   05/16/23 155 lb (70.3 kg)   05/08/23 155 lb (70.3 kg)     Body mass index is 26.5 kg/m².     Results for orders placed or performed during the hospital encounter of 12/04/23   Rapid Strep A Screen (Lc)    Specimen: Throat; Other   Result Value Ref Range    Rapid Strep A Result Negative for Beta Streptococcus, Group A Negative for Strep A Antigen   Rapid SARS-CoV-2 by PCR    Specimen: Nares; Other   Result Value Ref Range    Rapid SARS-CoV-2 by PCR Not Detected Not Detected   POCT Flu Test    Specimen: Nares; Other   Result Value Ref Range    POCT INFLUENZA A Negative Negative    POCT INFLUENZA B Negative Negative       Current Outpatient Medications   Medication Sig Dispense Refill    cyanocobalamin 1000 MCG/ML Injection Solution Inject 1 mL (1,000 mcg total) into the skin every 30 (thirty) days.      metoprolol tartrate 25 MG Oral Tab Take 1 tablet (25 mg total) by mouth 2 (two) times daily.      Magnesium Gluconate 250 MG Oral Tab Take 250 mg by mouth daily.      famotidine 20 MG Oral Tab Take 1 tablet (20 mg total) by mouth daily.        Past Medical History:   Diagnosis Date    Anxiety     Esophageal reflux     Mitral valve prolapse 1/2/2024    OTHER DISEASES     right pulmonary nodule - was found to be benign     OTHER DISEASES     HX childhood pneumonia      Past Surgical History:   Procedure Laterality Date    ANGIOGRAM      2017 - found to have myocardial bridge     OTHER SURGICAL HISTORY  June 2010 [facility]    lung BX     SHOULDER  ARTHROSCOPY        History reviewed. No pertinent family history.   Social History:  Social History     Socioeconomic History    Marital status:    Tobacco Use    Smoking status: Former     Packs/day: 0.50     Years: 10.00     Additional pack years: 0.00     Total pack years: 5.00     Types: Cigarettes    Smokeless tobacco: Never   Vaping Use    Vaping Use: Never used   Substance and Sexual Activity    Alcohol use: Not Currently     Comment: occasional    Drug use: Never   Other Topics Concern     Service No    Blood Transfusions No    Caffeine Concern No    Occupational Exposure No    Hobby Hazards No    Sleep Concern Yes    Stress Concern No    Weight Concern No    Special Diet No    Back Care No    Exercise Yes    Bike Helmet No    Seat Belt Yes    Self-Exams No      Occ: Disability .   : . Children: 3  Exercise: minimal.  Diet: watches sugar closely     REVIEW OF SYSTEMS:   GENERAL: feels well overall, denies fever or chills, denies change in weight  SKIN: denies any unusual skin lesions  EYES: denies changes in vision  HENT: denies upper respiratory symptoms  LUNGS: denies ZEHRA, wheezing, cough, orthopnea and PND  CARDIOVASCULAR: denies CP, palpitations, rapid or slow heart rate. Denies SHERIFF/lower extremity swelling  GI: denies abdominal pain,denies heartburn, denies n/v/c/d/change in stools/blood in stool/black stool/change in appetite  : denies nocturia or changes in urinary stream, denies scrotal mass/abnormal discharge from urethra  MUSCULOSKELETAL: denies joint or muscle aches or pains  NEURO: denies headaches/dizziness  PSYCH: denies depression or anxiety  HEMATOLOGIC: denies easy bruising/bleeding/anemia/blood clot disorders  ENDOCRINE: denies weight gain/weight loss/enlargement of neck glands/polyuria/polydypsia  ALL/ASTHMA: denies allergic rhinitis/asthma    EXAM:   /64   Pulse 97   Temp 98 °F (36.7 °C) (Temporal)   Resp 16   Ht 5' 11\" (1.803 m)    Wt 190 lb (86.2 kg)   SpO2 98%   BMI 26.50 kg/m²   Body mass index is 26.5 kg/m².   GENERAL: NAD, pleasant, well developed, normal voice  SKIN: no rashes, has a dark mole 4 mm in size on right side abdomen needs biopsy  HENT: NCAT, EACs clear b/l, TMs normal b/l, nasal turbinatess normal b/l, oropharynx with mmm/clear/normal, gingiva normal, lips normal  EYES: PERRLA, EOMI, conjunctiva non-injected and non-icteric  NECK: supple, no adenopathy/thyromegaly/thyroid nodules/masses  CHEST: no chest tenderness  BREAST: no suspicious masses appreciated on palpation  LUNGS: CTA A/P, no wheezes/ronchi/rales/crackles, normal air excursion  CARDIOVASCULAR: RRR, no murmur, no lower extremity edema, pedal and femoral pulses 2+ and symmetric b/l  GI: normoactive BS, non-distended, non-tender to palpation, no HSM/masses/pulsations  : two descended testes, no scrotal masses, no hernia, no penile lesions  MUSCULOSKELETAL: Back with normal AROM, no joint swelling, extremities x 4 with normal strength 5/5 and symmetric and with normal AROM/PROM.   EXTREMITIES: no cyanosis, clubbing or edema  NEURO: A&O x3, CN II-XII grossly intact. Reflexes: biceps and patellar 2+ b/l and symmetric. Gait is normal.  PSYCH: normal affect, no apparent thought disorder, average judgement and insight    Immunization History   Administered Date(s) Administered    Covid-19 Vaccine Moderna 100 mcg/0.5 ml 04/05/2021, 05/24/2021    Influenza 11/22/2013, 10/11/2016       ASSESSMENT AND PLAN:   Dwight Cardenas is a 38 year old male who presents for a complete physical exam.   Encounter Diagnoses   Name Primary?    Well adult exam Yes    Abnormal ANCA test     High total serum IgG     Coordination impairment     Flushing     Complaints of memory disturbance     History of prior cigarette smoking     Multiple pigmented nevi     Neuralgia     Encounter for lipid screening for cardiovascular disease        Orders Placed This Encounter   Procedures    Monoclonal  Protein Study    Protein Electrophoresis with JADA & IgA,IgG,IgM, Serum    THYROID PEROXIDASE AND THYROGLOBULIN ANTIBODIES [7260] [Q]    Vitamin B12 [E]    Folic Acid Serum [E]    Lipid Panel       Meds & Refills for this Visit:  Requested Prescriptions      No prescriptions requested or ordered in this encounter       Imaging & Consults:  RHEUMATOLOGY - EXTERNAL  ALLERGY - INTERNAL  DERM - INTERNAL  1. Well adult exam  Recommend healthy diet including green leafy vegetables, fresh fruits and lean meats.  Aerobic exercise as tolerated 30 minutes five days a week for cardiovascular fitness and 45-60 minutes 6-7 days a week for weight loss. Advised testicular self exam once a month    2. Abnormal ANCA test  - Rheumatology Referral - External  - Allergy Referral - In Network    3. High total serum IgG    - Rheumatology Referral - External [facility]  - Allergy Referral - In Network    4. Coordination impairment  Continue with follow-ups with neurologist referral requested for [facility] rheumatologist for second opinion  Get MRI of brain and EMG testing for extremities as planned  - Rheumatology Referral - External  - Monoclonal Protein Study; Future  - Protein Electrophoresis with JADA & IgA,IgG,IgM, Serum; Future  - THYROID PEROXIDASE AND THYROGLOBULIN ANTIBODIES [7260] [Q]; Future  - Vitamin B12 [E]; Future  - Folic Acid Serum [E]; Future    5. Flushing  - Rheumatology Referral - External  - Allergy Referral - In Network  - Monoclonal Protein Study; Future  - Protein Electrophoresis with JADA & IgA,IgG,IgM, Serum; Future  - THYROID PEROXIDASE AND THYROGLOBULIN ANTIBODIES [7260] [Q]; Future  - Vitamin B12 [E]; Future  - Folic Acid Serum [E]; Future    6. Complaints of memory disturbance  As above follow-up with rheumatologist and neurologist    7. History of prior cigarette smoking  Continue with smoking cessation    8. Multiple pigmented nevi  - Derm Referral - In Network    9. Neuralgia  Continue with  follow-ups with neurologist has MRI brain and EMG to extremities scheduled    10. Encounter for lipid screening for cardiovascular disease  - Lipid Panel; Future  Time spent was 55 minutes more than 50% was spent on counseling regarding medical conditions and treatment.  Rest of time was spent reviewing chart, reviewing blood work and radiology tests.        The patient verbalizes understanding of these recommendations and agrees to the plan.  The patient is asked to return for removal of dark mole on right side of abdomen unless he is able to get into dermatology.  Advised to return as needed. Also, for nurse visit in the Fall for influenza immunization.

## 2024-01-11 ENCOUNTER — LAB ENCOUNTER (OUTPATIENT)
Dept: LAB | Age: 39
End: 2024-01-11
Attending: FAMILY MEDICINE
Payer: MEDICAID

## 2024-01-11 DIAGNOSIS — R27.8 COORDINATION IMPAIRMENT: ICD-10-CM

## 2024-01-11 DIAGNOSIS — R23.2 FLUSHING: ICD-10-CM

## 2024-01-11 DIAGNOSIS — Z13.6 ENCOUNTER FOR LIPID SCREENING FOR CARDIOVASCULAR DISEASE: ICD-10-CM

## 2024-01-11 DIAGNOSIS — E06.3 HASHIMOTO'S DISEASE: ICD-10-CM

## 2024-01-11 DIAGNOSIS — Z13.220 ENCOUNTER FOR LIPID SCREENING FOR CARDIOVASCULAR DISEASE: ICD-10-CM

## 2024-01-11 PROBLEM — R60.1 GENERALIZED EDEMA: Status: RESOLVED | Noted: 2022-08-05 | Resolved: 2024-01-11

## 2024-01-11 PROBLEM — R76.8 ABNORMAL ANCA TEST: Status: ACTIVE | Noted: 2024-01-11

## 2024-01-11 PROBLEM — R07.89 PRESSURE IN CHEST: Status: RESOLVED | Noted: 2022-08-05 | Resolved: 2024-01-11

## 2024-01-11 PROBLEM — M25.562 ACUTE PAIN OF LEFT KNEE: Status: RESOLVED | Noted: 2018-08-22 | Resolved: 2024-01-11

## 2024-01-11 PROBLEM — R76.8 HIGH TOTAL SERUM IGG: Status: ACTIVE | Noted: 2024-01-11

## 2024-01-11 PROBLEM — R07.2 PRECORDIAL PAIN: Status: RESOLVED | Noted: 2018-08-10 | Resolved: 2024-01-11

## 2024-01-11 LAB
CHOLEST SERPL-MCNC: 190 MG/DL (ref ?–200)
FASTING PATIENT LIPID ANSWER: NO
FOLATE SERPL-MCNC: 21.7 NG/ML (ref 8.7–?)
HDLC SERPL-MCNC: 68 MG/DL (ref 40–59)
IGA SERPL-MCNC: 253 MG/DL (ref 70–312)
IGM SERPL-MCNC: 85.2 MG/DL (ref 43–279)
IMMUNOGLOBULIN PNL SER-MCNC: 1170 MG/DL (ref 791–1643)
LDLC SERPL CALC-MCNC: 109 MG/DL (ref ?–100)
NONHDLC SERPL-MCNC: 122 MG/DL (ref ?–130)
THYROGLOB SERPL-MCNC: 22 U/ML (ref ?–60)
THYROPEROXIDASE AB SERPL-ACNC: 724 U/ML (ref ?–60)
TRIGL SERPL-MCNC: 69 MG/DL (ref 30–149)
VIT B12 SERPL-MCNC: 1967 PG/ML (ref 193–986)
VLDLC SERPL CALC-MCNC: 12 MG/DL (ref 0–30)

## 2024-01-11 PROCEDURE — 86334 IMMUNOFIX E-PHORESIS SERUM: CPT

## 2024-01-11 PROCEDURE — 84165 PROTEIN E-PHORESIS SERUM: CPT

## 2024-01-11 PROCEDURE — 83521 IG LIGHT CHAINS FREE EACH: CPT

## 2024-01-11 PROCEDURE — 82746 ASSAY OF FOLIC ACID SERUM: CPT

## 2024-01-11 PROCEDURE — 82607 VITAMIN B-12: CPT

## 2024-01-11 PROCEDURE — 84443 ASSAY THYROID STIM HORMONE: CPT

## 2024-01-11 PROCEDURE — 84439 ASSAY OF FREE THYROXINE: CPT

## 2024-01-11 PROCEDURE — 86376 MICROSOMAL ANTIBODY EACH: CPT

## 2024-01-11 PROCEDURE — 82784 ASSAY IGA/IGD/IGG/IGM EACH: CPT

## 2024-01-11 PROCEDURE — 80061 LIPID PANEL: CPT

## 2024-01-11 PROCEDURE — 36415 COLL VENOUS BLD VENIPUNCTURE: CPT

## 2024-01-11 PROCEDURE — 86800 THYROGLOBULIN ANTIBODY: CPT

## 2024-01-11 PROCEDURE — 84480 ASSAY TRIIODOTHYRONINE (T3): CPT

## 2024-01-12 DIAGNOSIS — R20.0 NUMBNESS AND TINGLING OF LEFT UPPER EXTREMITY: ICD-10-CM

## 2024-01-12 DIAGNOSIS — G25.0 TREMOR, ESSENTIAL: ICD-10-CM

## 2024-01-12 DIAGNOSIS — M79.661 PAIN IN BOTH LOWER LEGS: ICD-10-CM

## 2024-01-12 DIAGNOSIS — M79.662 PAIN IN BOTH LOWER LEGS: ICD-10-CM

## 2024-01-12 DIAGNOSIS — E06.3 HASHIMOTO'S DISEASE: Primary | ICD-10-CM

## 2024-01-12 DIAGNOSIS — R76.8 HIGH TOTAL SERUM IGG: ICD-10-CM

## 2024-01-12 DIAGNOSIS — R41.3 COMPLAINTS OF MEMORY DISTURBANCE: ICD-10-CM

## 2024-01-12 DIAGNOSIS — R20.2 NUMBNESS AND TINGLING OF LEFT UPPER EXTREMITY: ICD-10-CM

## 2024-01-12 DIAGNOSIS — R53.83 FATIGUE, UNSPECIFIED TYPE: ICD-10-CM

## 2024-01-12 LAB
T3 SERPL-MCNC: 122 NG/DL (ref 60–181)
T4 FREE SERPL-MCNC: 0.8 NG/DL (ref 0.8–1.7)
TSI SER-ACNC: 1.43 MIU/ML (ref 0.36–3.74)

## 2024-01-12 NOTE — PROGRESS NOTES
Thyroid functioning is normal with T4 free and TSH no thyroid medication is needed but due to elevation in thyroid antibody this is a form of autoimmune disease will refer you to an endocrinologist secondary to the amount of symptoms you have

## 2024-01-12 NOTE — PROGRESS NOTES
B12 is elevated do not take more than 500 mcg of B12 daily    Thyroid peroxidase antibody is elevated you most likely have Hashimoto's autoimmune disease adding a TSH, T4 free and T3 total may have you see endocrinologist if these are normal.  Please let your neurologist know that your thyroid antibody was elevated since sometimes there is a rare connection between this and brain function.  Ordering a thyroid ultrasound. Scheduling Instructions:  To schedule an appointment for your radiology test please call [facility] at [phone].  Lipids look good.  Folic acid is normal  IgA, IGG and IGM are normal still awaiting protein electrophoresis results.

## 2024-01-15 ENCOUNTER — TELEPHONE (OUTPATIENT)
Dept: FAMILY MEDICINE CLINIC | Facility: CLINIC | Age: 39
End: 2024-01-15

## 2024-01-15 LAB
KAPPA LC FREE SER-MCNC: 3.02 MG/DL (ref 0.33–1.94)
KAPPA LC FREE/LAMBDA FREE SER NEPH: 1.34 {RATIO} (ref 0.26–1.65)
LAMBDA LC FREE SERPL-MCNC: 2.26 MG/DL (ref 0.57–2.63)

## 2024-01-15 NOTE — TELEPHONE ENCOUNTER
Patient calling states he would like, thyroid results be faxed to Neurologist FAX:[phone] and Rheumatology.     Please advise

## 2024-01-15 NOTE — TELEPHONE ENCOUNTER
Pt. Is extremely anxious about the Kappa light chain results that he just got. He would like to hear from you by phone or chart message as to what this means.

## 2024-01-16 LAB
ALBUMIN SERPL ELPH-MCNC: 4.37 G/DL (ref 3.75–5.21)
ALBUMIN/GLOB SERPL: 1.49 {RATIO} (ref 1–2)
ALPHA1 GLOB SERPL ELPH-MCNC: 0.28 G/DL (ref 0.19–0.46)
ALPHA2 GLOB SERPL ELPH-MCNC: 0.72 G/DL (ref 0.48–1.05)
B-GLOBULIN SERPL ELPH-MCNC: 0.87 G/DL (ref 0.68–1.23)
GAMMA GLOB SERPL ELPH-MCNC: 1.06 G/DL (ref 0.62–1.7)
KAPPA LC FREE SER-MCNC: 3.02 MG/DL (ref 0.33–1.94)
KAPPA LC FREE/LAMBDA FREE SER NEPH: 1.34 {RATIO} (ref 0.26–1.65)
LAMBDA LC FREE SERPL-MCNC: 2.26 MG/DL (ref 0.57–2.63)
PROT SERPL-MCNC: 7.3 G/DL (ref 5.7–8.2)

## 2024-01-16 NOTE — TELEPHONE ENCOUNTER
Sent to [facility]     Kappa free light chains elevated this usually happens in inflammatory conditions,  the kappa lambda ratio is normal.  I am still awaiting on the complete protein electrophoresis to see if there are any monoclonal proteins present to make a complete interpretation of the test results.

## 2024-01-16 NOTE — PROGRESS NOTES
Kappa free light chains elevated this usually happens in inflammatory conditions,  the kappa lambda ratio is normal.  I am still awaiting on the complete protein electrophoresis to see if there are any monoclonal proteins present to make a complete interpretation of the test results.

## 2024-01-26 ENCOUNTER — TELEPHONE (OUTPATIENT)
Dept: FAMILY MEDICINE CLINIC | Facility: CLINIC | Age: 39
End: 2024-01-26

## 2024-01-26 ENCOUNTER — HOSPITAL ENCOUNTER (OUTPATIENT)
Dept: ULTRASOUND IMAGING | Age: 39
Discharge: HOME | End: 2024-01-26
Attending: FAMILY MEDICINE
Payer: MEDICAID

## 2024-01-26 DIAGNOSIS — E06.3 HASHIMOTO'S DISEASE: ICD-10-CM

## 2024-01-26 PROCEDURE — 76536 US EXAM OF HEAD AND NECK: CPT | Performed by: FAMILY MEDICINE

## 2024-01-26 NOTE — TELEPHONE ENCOUNTER
Patient calling after his abnormal US and is very concerned. He would like to speak to nurse/Karmen Ellison regarding next steps - and is also concerned that he will have to wait a long time to get in with Specialist.   Please advise.

## 2024-01-26 NOTE — TELEPHONE ENCOUNTER
Spoke w/pt. And then w/Karmen LEOS to relay his questions in regards to why he cannot start medication for his Hashimoto's. Per Karmen explained that his thyroid test are normal and functioning normal so there is no medication that would be helpful. Pt. Needs to keep upcoming appt. W/endocrinologist. Pt. Scheduled in February

## 2024-01-27 NOTE — PROGRESS NOTES
Ultrasound shows no nodules tissue with diffuse parenchymal heterogeneity seen with Hashimoto's.  Your thyroid function is normal presently meaning your thyroid-stimulating hormone, T3 total and T4 free are within normal range.  With your array of clinical symptoms  keep appointment with endocrinologist to discuss.  The thyroid function may change and should be monitored possibly every 6 months.

## 2024-02-01 ENCOUNTER — TELEPHONE (OUTPATIENT)
Dept: FAMILY MEDICINE CLINIC | Facility: CLINIC | Age: 39
End: 2024-02-01

## 2024-02-01 NOTE — TELEPHONE ENCOUNTER
Per patient would like last lab results from [facility] sent to Dr. Lynch, infectious disease specialist.   Fax# [phone]

## 2024-02-15 ENCOUNTER — PATIENT MESSAGE (OUTPATIENT)
Facility: CLINIC | Age: 39
End: 2024-02-15

## 2024-02-15 ENCOUNTER — TELEPHONE (OUTPATIENT)
Facility: CLINIC | Age: 39
End: 2024-02-15

## 2024-02-15 ENCOUNTER — OFFICE VISIT (OUTPATIENT)
Facility: CLINIC | Age: 39
End: 2024-02-15
Payer: MEDICAID

## 2024-02-15 VITALS
HEIGHT: 71 IN | OXYGEN SATURATION: 98 % | DIASTOLIC BLOOD PRESSURE: 80 MMHG | SYSTOLIC BLOOD PRESSURE: 116 MMHG | BODY MASS INDEX: 26.6 KG/M2 | HEART RATE: 78 BPM | RESPIRATION RATE: 18 BRPM | WEIGHT: 190 LBS

## 2024-02-15 DIAGNOSIS — R23.2 FLUSHING: ICD-10-CM

## 2024-02-15 DIAGNOSIS — R53.1 WEAKNESS: Primary | ICD-10-CM

## 2024-02-15 DIAGNOSIS — R51.9 FREQUENT HEADACHES: ICD-10-CM

## 2024-02-15 PROCEDURE — 99205 OFFICE O/P NEW HI 60 MIN: CPT | Performed by: STUDENT IN AN ORGANIZED HEALTH CARE EDUCATION/TRAINING PROGRAM

## 2024-02-15 NOTE — TELEPHONE ENCOUNTER
Hi! Yes, I did see that - IGG4 disease is nothing something within the field of endocrinology, this is usually inflammatory disease rather than driven by hormones. Since IGG4 is not within my field of medicine, I do not have specific recommendations on testing or treatment for the IGG4 levels. Since IGG4 disease can often involve the thyroid, though, I will plan on following his thyroid levels closely as we discussed to ensure no thyroid damage develops. However, positive thyroid antibodies are not a cause of IGG4 disease and time will tell if he goes on to develop true damage to his thyroid, as we discussed in his visit. I would start with ruling out the rare hormone conditions we discussed in our visit today, and [facility] with [facility] suggested by his other physician as they may have more experience with inflammatory/IGG4 disease. Thanks!

## 2024-02-15 NOTE — PATIENT INSTRUCTIONS
Avoid coffee, alcohol, Tylenol, cough medicine for 5 days before having your blood drawn.   When you go for labs, have the blood drawn around 8AM and fasting after midnight.  You have positive thyroid antibodies but you are NOT hypothyroid, so we wouldn't technically label you with Hashimoto's. However, you might go on to develop it eventually. We need to watch you at least every year for developing it and needing thyroid hormones.    Return Visit   [  ] Physician in 4-5 weeks  [  ] After visit summary   [  ] Fasting/8AM labs

## 2024-02-15 NOTE — TELEPHONE ENCOUNTER
2/15/24 patient called in requesting to speak with Dr. Mcnair    Patient forgot to mention IGG 4 levels were high on last labs; wasn't sure if MD was able to see those labs;  requesting a phone call to discuss.

## 2024-02-15 NOTE — TELEPHONE ENCOUNTER
From: Dwight Cardenas Jr.  To: Fadumo Mcnair  Sent: 2/15/2024 1:26 PM CST  Subject: Igg 4 levels     I dont know if [facility] office sent that over to  but she said that was the other reason she was sending me to u because my igg 4 levels are high like 145 that wasn’t really talked bout so didnt know if u were a aware of it

## 2024-02-16 NOTE — H&P
Endocrinology Clinic Note    Name: Dwight Cardenas Jr.    Date: 2/15/2024       HISTORY OF PRESENT ILLNESS   Dwight Cardenas Jr. is a 39 year old male with PMHx significant for anxiety, GERD, recent constellation of symptoms including fevers/chills, constant fatigue, burning sensation, weakness, headaches, brain fog, disequilibrium who presents for initial endocrine consultation for thyroid evaluation. Patient has had extensive neurologic and rheumatologic evaluation with suggestion of inflammatory syndrome but no clear diagnosis. He has been referred to academic/tertiary centers for further evaluation. Notes persistent symptoms as above. Reports primary care obtained blood testing and told him he has Hashimoto's disease - suggested he be evaluated by endocrinology. No other acute complaints at this time aside from persistence of above symptoms with abdominal discomfort and fatigue. Denies sweating, wheezing, high blood pressure, diarrhea, loss of visual fields, loss of peripheral vision.      PAST MEDICAL HISTORY:   Past Medical History:   Diagnosis Date    Anxiety     Esophageal reflux     Mitral valve prolapse 1/2/2024    OTHER DISEASES     right pulmonary nodule - was found to be benign     OTHER DISEASES     HX childhood pneumonia       PAST SURGICAL HISTORY:   Past Surgical History:   Procedure Laterality Date    ANGIOGRAM      2017 - found to have myocardial bridge     OTHER SURGICAL HISTORY  June 2010 [facility]    lung BX     SHOULDER ARTHROSCOPY         CURRENT MEDICATIONS:    Current Outpatient Medications   Medication Sig Dispense Refill    cyanocobalamin 1000 MCG/ML Injection Solution Inject 1 mL (1,000 mcg total) into the skin every 30 (thirty) days.      metoprolol tartrate 25 MG Oral Tab Take 1 tablet (25 mg total) by mouth 2 (two) times daily.      Magnesium Gluconate 250 MG Oral Tab Take 250 mg by mouth daily.      famotidine 20 MG Oral Tab Take 1 tablet (20 mg total) by mouth daily.            ALLERGIES:  Allergies   Allergen Reactions    No Known Allergies        SOCIAL HISTORY:    Social History     Socioeconomic History    Marital status:    Tobacco Use    Smoking status: Former     Packs/day: 0.50     Years: 10.00     Additional pack years: 0.00     Total pack years: 5.00     Types: Cigarettes    Smokeless tobacco: Never   Vaping Use    Vaping Use: Never used   Substance and Sexual Activity    Alcohol use: Not Currently     Comment: occasional    Drug use: Never   Other Topics Concern     Service No    Blood Transfusions No    Caffeine Concern No    Occupational Exposure No    Hobby Hazards No    Sleep Concern Yes    Stress Concern No    Weight Concern No    Special Diet No    Back Care No    Exercise Yes    Bike Helmet No    Seat Belt Yes    Self-Exams No       FAMILY HISTORY:   Family History   Problem Relation Age of Onset    Other (lung cancer) Mother     Diabetes Father     Other (Quadruple bypass) Father     Colon Cancer Maternal Grandfather     Other (quad bypass) Paternal Grandfather          REVIEW OF SYSTEMS:  Ten point review of systems has been performed and is otherwise negative and/or non-contributory, except as described above.      PHYSICAL EXAM:   Vitals:    02/15/24 1112   BP: 116/80   BP Location: Right arm   Patient Position: Sitting   Cuff Size: adult   Pulse: 78   Resp: 18   SpO2: 98%   Weight: 190 lb (86.2 kg)   Height: 5' 11\" (1.803 m)     BMI: Body mass index is 26.5 kg/m².     CONSTITUTIONAL:  awake, alert, cooperative, no apparent distress, and appears stated age  PSYCH: normal affect  EYES:  No proptosis,  conjunctiva normal  ENT:  Normocephalic, atraumatic  NECK:  Supple, symmetrical, thyroid not enlarged and no tenderness  LUNGS: breathing comfortably  CARDIOVASCULAR:  regular rate   ABDOMEN:  soft  SKIN:  no rashes and no lesions  EXTREMITIES: no edema      DATA:      Latest Reference Range & Units 05/05/08 14:25 05/02/11 14:00 01/29/12 20:11  12/05/13 11:54 09/25/21 21:58 06/13/23 19:59 01/11/24 11:41   T4,Free (Direct) 0.8 - 1.7 ng/dL   0.9    0.8   TSH 0.358 - 3.740 mIU/mL 0.754 2.150 0.970 0.837 0.908 0.701 1.430   T3 TOTAL 60 - 181 ng/dL       122   ANTI-THYROGLOBULIN <60 U/mL       22   ANTI-THYROPEROXIDASE <60 U/mL       724 (H)   (H): Data is abnormally high\    US THYROID (1/26/24)    FINDINGS:       MEASUREMENTS:  RIGHT LOBE:  3.9 x 2.0 x 1.7 cm.  LEFT LOBE:  4.0 x 1.6 x 1.6 cm.  ISTHMUS:  0.4 cm.     NODULES:  No discrete nodule or cyst.     OTHER:  Diffuse parenchymal heterogeneity.        Impression   CONCLUSION:  Heterogeneous thyroid gland.        ASSESSMENT AND PLAN:    #Positive antithyroid antibodies  -Patient biochemically euthyroid but with recent finding of positive TPO antibody  -Extensively reviewed with patient that positive antithyroid antibodies by themselves do not confer the diagnosis of Hashimoto's disease - this diagnosis requires both positive antibodies and clinical and biochemical hypothyroidism evidenced by elevated TSH, low free thyroid hormones  -Many patients with positive antithyroid antibodies never go on to develop clinical hypothyroidism and never require thyroid hormone replacement  -Reviewed that there has been association between IGG4 disease and frequency of thyroid derangement, but no causative link has been identified  -US thyroid results noted, heterogeneity can be seen in early phase of Hashimoto's but is also nonspecific   -Hashimoto's encephalopathy is a syndrome presenting with AMS, confusion, hallucinations, delusions, ataxia, myoclonus, occasionally seizures associated with very high antibody titers for TPO/antiTG. This syndrome has been associated with an aggressive form of Hashimoto's disease which has been associated with IGG4 disease. Patient's current clinical presentation and symptoms, as well as degree of antibody titers, do not match this diagnosis.    -HE is usually responsive to  glucocorticoids and pt did report his symptoms improved somewhat with steroid taper, but he lacks aggressive neurologic features to suggest this syndrome as above.   -This is a diagnosis of exclusion after other identifiable causes of encephalopathy are excluded, brain MRI and CSF studies are normal/nonspecific, etc.  -There is no indication for thyroid hormone replacement this time, as pt is biochemically euthyroid his current array of symptoms cannot be attributed to his thyroid function  -Recommend monitoring TFTs at least yearly to assess for progression to overt hypothyroidism/Hashimoto's disease    #Weakness  #Flushing/burning sensation  #Fatigue  -Explained that there is low suspicion for endocrine/hormonal  of the patient's current symptoms/presentation, particularly with regard to abdominal pain/tense sensation  -Patient denies any sweating, wheezing, diarrhea, or other symptoms that would suggest carcinoid syndrome or NET  -Given fatigue, \"burning\"/flushing sensation, reasonable to rule out hypogonadism, adrenal insufficiency, PPGL    The above plan was discussed in detail with the patient who verbalized understanding and agreement.      A total of 60 minutes was spent today on obtaining history, reviewing pertinent labs, reviewing relevant pathophysiology with patient, evaluating patient, providing multiple treatment options, and completing documentation and orders.      Fadumo Mcnair DO  [facility]  2/15/2024     Note to patient: The 21 Century Cures Act makes medical notes like these available to patients in the interest of transparency. However, be advised this is a medical document. It is intended as peer to peer communication. It is written in medical language and may contain abbreviations or verbiage that are unfamiliar. It may appear blunt or direct. Medical documents are intended to carry relevant information, facts as evident, and the clinical opinion of the practitioner.

## 2024-02-26 ENCOUNTER — LAB ENCOUNTER (OUTPATIENT)
Dept: LAB | Age: 39
End: 2024-02-26
Attending: STUDENT IN AN ORGANIZED HEALTH CARE EDUCATION/TRAINING PROGRAM
Payer: MEDICAID

## 2024-02-26 DIAGNOSIS — R23.2 FLUSHING: ICD-10-CM

## 2024-02-26 DIAGNOSIS — R53.1 WEAKNESS: ICD-10-CM

## 2024-02-26 DIAGNOSIS — R51.9 FREQUENT HEADACHES: ICD-10-CM

## 2024-02-26 LAB
CORTIS SERPL-MCNC: 12.4 UG/DL
FSH SERPL-ACNC: 2.9 MIU/ML
LH SERPL-ACNC: 5.2 MIU/ML
PROLACTIN SERPL-MCNC: 9.9 NG/ML

## 2024-02-26 PROCEDURE — 82024 ASSAY OF ACTH: CPT

## 2024-02-26 PROCEDURE — 83835 ASSAY OF METANEPHRINES: CPT

## 2024-02-26 PROCEDURE — 84270 ASSAY OF SEX HORMONE GLOBUL: CPT

## 2024-02-26 PROCEDURE — 36415 COLL VENOUS BLD VENIPUNCTURE: CPT

## 2024-02-26 PROCEDURE — 83001 ASSAY OF GONADOTROPIN (FSH): CPT

## 2024-02-26 PROCEDURE — 82533 TOTAL CORTISOL: CPT

## 2024-02-26 PROCEDURE — 83002 ASSAY OF GONADOTROPIN (LH): CPT

## 2024-02-26 PROCEDURE — 84146 ASSAY OF PROLACTIN: CPT

## 2024-02-26 PROCEDURE — 84410 TESTOSTERONE BIOAVAILABLE: CPT

## 2024-02-27 ENCOUNTER — PATIENT MESSAGE (OUTPATIENT)
Dept: FAMILY MEDICINE CLINIC | Facility: CLINIC | Age: 39
End: 2024-02-27

## 2024-02-27 LAB — ACTH: 7.2 PG/ML

## 2024-03-01 LAB
BIOAVAILABLE TESTOSTERONE, %: 34.6 %
BIOAVAILABLE TESTOSTERONE: 148 NG/DL
SEX HORMONE BINDING GLOBULIN: 41.3 NMOL/L
TESTOSTERONE, TOTAL: 428 NG/DL

## 2024-03-03 LAB
METANEPHRINE: 36.5 PG/ML
NORMETANEPHRINE: 41.1 PG/ML

## 2024-03-04 NOTE — PROGRESS NOTES
Jesús kay,     Please let Dwight know his remaining tests are in and all endocrine testing is normal:    -Negative for adrenaline tumors (pheochromocytoma  -Testosterone levels are well within normal  -Pituitary hormones are all normal  -In light of previous positive thyroid antibodies, I recommend he have at least annual thyroid function tests done with his PCP. He can come back to see me at any time if his thyroid function becomes abnormal. Otherwise, we can cancel his 3/22 follow up as there is no evidence of any current endocrine disorders. Please let me know if he has any questions or concerns. Thanks!

## 2024-03-13 ENCOUNTER — APPOINTMENT (OUTPATIENT)
Dept: CT IMAGING | Facility: HOSPITAL | Age: 39
End: 2024-03-13
Attending: EMERGENCY MEDICINE
Payer: MEDICAID

## 2024-03-13 ENCOUNTER — HOSPITAL ENCOUNTER (EMERGENCY)
Facility: HOSPITAL | Age: 39
Discharge: HOME | End: 2024-03-13
Attending: EMERGENCY MEDICINE
Payer: MEDICAID

## 2024-03-13 VITALS
RESPIRATION RATE: 18 BRPM | WEIGHT: 190 LBS | BODY MASS INDEX: 26.6 KG/M2 | OXYGEN SATURATION: 100 % | SYSTOLIC BLOOD PRESSURE: 110 MMHG | DIASTOLIC BLOOD PRESSURE: 62 MMHG | HEART RATE: 70 BPM | TEMPERATURE: 98 F | HEIGHT: 71 IN

## 2024-03-13 DIAGNOSIS — K14.6 TONGUE PAIN: Primary | ICD-10-CM

## 2024-03-13 LAB
ALBUMIN SERPL-MCNC: 3.9 G/DL (ref 3.4–5)
ALBUMIN/GLOB SERPL: 1.1 {RATIO} (ref 1–2)
ALP LIVER SERPL-CCNC: 53 U/L
ALT SERPL-CCNC: 15 U/L
ANION GAP SERPL CALC-SCNC: 4 MMOL/L (ref 0–18)
AST SERPL-CCNC: 13 U/L (ref 15–37)
BASOPHILS # BLD AUTO: 0.06 X10(3) UL (ref 0–0.2)
BASOPHILS NFR BLD AUTO: 0.8 %
BILIRUB SERPL-MCNC: 0.4 MG/DL (ref 0.1–2)
BUN BLD-MCNC: 6 MG/DL (ref 9–23)
CALCIUM BLD-MCNC: 9.4 MG/DL (ref 8.5–10.1)
CHLORIDE SERPL-SCNC: 108 MMOL/L (ref 98–112)
CO2 SERPL-SCNC: 26 MMOL/L (ref 21–32)
CREAT BLD-MCNC: 0.85 MG/DL
EGFRCR SERPLBLD CKD-EPI 2021: 113 ML/MIN/1.73M2 (ref 60–?)
EOSINOPHIL # BLD AUTO: 0.21 X10(3) UL (ref 0–0.7)
EOSINOPHIL NFR BLD AUTO: 2.8 %
ERYTHROCYTE [DISTWIDTH] IN BLOOD BY AUTOMATED COUNT: 12 %
GLOBULIN PLAS-MCNC: 3.7 G/DL (ref 2.8–4.4)
GLUCOSE BLD-MCNC: 109 MG/DL (ref 70–99)
HCT VFR BLD AUTO: 40.9 %
HGB BLD-MCNC: 14.4 G/DL
IMM GRANULOCYTES # BLD AUTO: 0.01 X10(3) UL (ref 0–1)
IMM GRANULOCYTES NFR BLD: 0.1 %
LYMPHOCYTES # BLD AUTO: 2.63 X10(3) UL (ref 1–4)
LYMPHOCYTES NFR BLD AUTO: 35.5 %
MCH RBC QN AUTO: 31.2 PG (ref 26–34)
MCHC RBC AUTO-ENTMCNC: 35.2 G/DL (ref 31–37)
MCV RBC AUTO: 88.7 FL
MONOCYTES # BLD AUTO: 0.62 X10(3) UL (ref 0.1–1)
MONOCYTES NFR BLD AUTO: 8.4 %
NEUTROPHILS # BLD AUTO: 3.88 X10 (3) UL (ref 1.5–7.7)
NEUTROPHILS # BLD AUTO: 3.88 X10(3) UL (ref 1.5–7.7)
NEUTROPHILS NFR BLD AUTO: 52.4 %
OSMOLALITY SERPL CALC.SUM OF ELEC: 284 MOSM/KG (ref 275–295)
PLATELET # BLD AUTO: 237 10(3)UL (ref 150–450)
POTASSIUM SERPL-SCNC: 4.3 MMOL/L (ref 3.5–5.1)
PROT SERPL-MCNC: 7.6 G/DL (ref 6.4–8.2)
RBC # BLD AUTO: 4.61 X10(6)UL
SODIUM SERPL-SCNC: 138 MMOL/L (ref 136–145)
WBC # BLD AUTO: 7.4 X10(3) UL (ref 4–11)

## 2024-03-13 PROCEDURE — 80053 COMPREHEN METABOLIC PANEL: CPT | Performed by: EMERGENCY MEDICINE

## 2024-03-13 PROCEDURE — 99285 EMERGENCY DEPT VISIT HI MDM: CPT

## 2024-03-13 PROCEDURE — 70491 CT SOFT TISSUE NECK W/DYE: CPT | Performed by: EMERGENCY MEDICINE

## 2024-03-13 PROCEDURE — 85025 COMPLETE CBC W/AUTO DIFF WBC: CPT | Performed by: EMERGENCY MEDICINE

## 2024-03-13 PROCEDURE — 36415 COLL VENOUS BLD VENIPUNCTURE: CPT

## 2024-03-13 PROCEDURE — 70487 CT MAXILLOFACIAL W/DYE: CPT | Performed by: EMERGENCY MEDICINE

## 2024-03-13 RX ORDER — IOHEXOL 350 MG/ML
75 INJECTION, SOLUTION INTRAVENOUS
Status: COMPLETED | OUTPATIENT
Start: 2024-03-13 | End: 2024-03-13

## 2024-03-13 NOTE — ED INITIAL ASSESSMENT (HPI)
Pt c/o side and bottom of tongue swelling about an hour ago and throat pain for a few days ago. Pet scan done of whole body march 4 d/t fever of unknown. Pt c/o issues swallowing at times, denies ZEHRA. Pt denies taking medications for pain pta. Biopsy scheduled tomorrow. + heterozygous thyroid gland.

## 2024-03-14 NOTE — ED PROVIDER NOTES
Patient Seen in: [facility] Emergency Department      History     Chief Complaint   Patient presents with    Mouth Cold Sores     Stated Complaint: mouth sores    Subjective:   HPI    39-year-old male presents to ED with complaint of soreness at the base of his tongue for the last 2 months.  He states that he had a fever of unknown origin with throat pain and so they had done a PET scan and it revealed lingual tonsillar mass and palate mass concerning for malignancy, as well as a heterozygous thyroid gland.  He states he is scheduled for biopsy tomorrow with ENT.  He denies any new difficulty swallowing or tightness in his throat.  Denies shortness of breath, chest pain.    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 bridge     OTHER SURGICAL HISTORY  June 2010 [facility]    lung BX     SHOULDER ARTHROSCOPY                  Social History     Socioeconomic History    Marital status:    Tobacco Use    Smoking status: Former     Packs/day: 0.50     Years: 10.00     Additional pack years: 0.00     Total pack years: 5.00     Types: Cigarettes    Smokeless tobacco: Never   Vaping Use    Vaping Use: Never used   Substance and Sexual Activity    Alcohol use: Not Currently     Comment: occasional    Drug use: Never   Other Topics Concern     Service No    Blood Transfusions No    Caffeine Concern No    Occupational Exposure No    Hobby Hazards No    Sleep Concern Yes    Stress Concern No    Weight Concern No    Special Diet No    Back Care No    Exercise Yes    Bike Helmet No    Seat Belt Yes    Self-Exams No              Review of Systems    Positive for stated complaint: mouth sores  Other systems  are as noted in HPI.  Constitutional and vital signs reviewed.      All other systems reviewed and negative except as noted above.    Physical Exam     ED Triage Vitals [03/13/24 1558]   /80   Pulse 114   Resp 22   Temp 98.3 °F (36.8 °C)   Temp src Temporal   SpO2 97 %   O2 Device None (Room air)       Current:/62   Pulse 70   Temp 98.3 °F (36.8 °C) (Temporal)   Resp 18   Ht 180.3 cm (5' 11\")   Wt 86.2 kg   SpO2 100%   BMI 26.50 kg/m²         Physical Exam              HEENT: Photo above reveals issue of concern that brought patient to ED, tiny 3 mm masslike projections off the base of his tongue slightly erythematous, no fluctuance.  Poor dental hygiene also noted.  He also notes whitish plaque along his gums where the teeth are missing.  ED Course     Labs Reviewed   COMP METABOLIC PANEL (14) - Abnormal; Notable for the following components:       Result Value    Glucose 109 (*)     BUN 6 (*)     AST 13 (*)     ALT 15 (*)     All other components within normal limits   CBC WITH DIFFERENTIAL WITH PLATELET    Narrative:     The following orders were created for panel order CBC With Differential With Platelet.  Procedure                               Abnormality         Status                     ---------                               -----------         ------                     CBC W/ DIFFERENTIAL[[id number]]                              Final result                 Please view results for these tests on the individual orders.   CBC W/ DIFFERENTIAL             CT SOFT TISSUE OF NECK(CONTRAST ONLY) (CPT=70491)    Result Date: 3/13/2024  PROCEDURE:  CT SOFT TISSUE OF NECK(CONTRAST ONLY) (CPT=70491)  COMPARISON:  None.  INDICATIONS:  tongue swelling since 3p, tongue masses noted bilaterally at proximal tongue, recent PET scan with enlargement of lingual tonsils, masslike uptake in palate pos  TECHNIQUE:  IV contrast-enhanced multislice CT scanning is performed through the neck soft tissues during  administration of nonionic contrast.  Dose reduction techniques were used. Dose information is transmitted to the ACR (American College of Radiology) NRDR (National Radiology Data Registry) which includes the Dose Index Registry.  PATIENT STATED HISTORY:(As transcribed by Technologist)    CONTRAST USED:   FINDINGS: NASOPHARYNX:  Fossae of Rosenmuller and torus tubarius are symmetric.  ORAL CAVITY:  No visible mass.  The visualized oral cavity is unremarkable.  There is artifact related to permanent dental hardware obscuring portions of the tongue.  No enhancing mass lesion noted. OROPHARYNX:  There is mild diffuse enlargement, which is symmetric, involving the lingual tonsils.  There is normal symmetric enhancement without focal mass. HYPOPHARYNX:  No mass or other visible lesion.  LARYNX:  The vocal cords are symmetric and without mass.  SINUSES:  Limited views show no significant fluid or mucosal thickening.  NECK GLANDS:  The parotid, submandibular, and thyroid glands are unremarkable.  LYMPH NODES:  No pathological-appearing or enlarged lymph nodes.  SKULL BASE:  Foramina are symmetric without bony erosion.  VASCULATURE:  Limited views are unremarkable.  BONES:  No significant osseous lesions.  OTHER:  No additional imaging findings.             CONCLUSION:  There is mild diffuse enlargement, which is symmetric, involving the lingual tonsils.  There is normal symmetric enhancement without focal mass.  No mass within the oral cavity within the limitations of the exam.  No lymphadenopathy.  The outside images are submitted a comparison will be made.  [place]ATION:  [facility]    Dictated by (CST): Franki Garcia MD on 3/13/2024 at 9:37 PM     Finalized by (CST): Franki Garcia MD on 3/13/2024 at 9:39 PM               [facility]      Medical Decision Making:    Differential diagnosis before testing includes base of tongue masslike projections could be related to possible underlying malignancy, unlikely infectious given  this appearance unlikely allergic reaction given lack of hives or tightness in throat potential life threatening diagnosis which is a medical condition that poses a threat to life/function.     Comorbidities that add complexity to management: Possible underlying malignancy as noted above    I reviewed prior external ED notes including reviewed [facility] My[facility] PET scan results the patient showed me    Discussions of management with: Discussed case with Dr. Garcia, radiology, states he does not see anything on the tongue concerning    I personally reviewed the CT face and my independent interpretation includes symmetric mild diffuse enlargement of the lingual tonsils.    Shared decision making:    3 mm masslike projections of the tongue with soreness as if she was concerned about patient to ED.  Discussed with patient this does not appear consistent with infection.  Discussed with patient that this may be related to possible lingual tonsillar and palate prominence that could be underlying malignancy and discussed with him that he should follow-up with ENT who is going to see tomorrow when they do the biopsy.  CT face unrevealing.  Labs unremarkable.                               Medical Decision Making      Disposition and Plan     Clinical Impression:  1. Tongue pain         Disposition:  Discharge  3/13/2024 10:04 pm    Follow-up:  ENT    Follow up in 1 day(s)            Medications Prescribed:  Discharge Medication List as of 3/13/2024 10:10 PM

## 2024-03-14 NOTE — DISCHARGE INSTRUCTIONS
You are in the process of being worked up for lymphoma.  You came in for our projections on your tongue that are painful.  CT scan of the face was done today and did not show any significant findings to explain the painful tongue projections.  You are advised to follow-up with your ENT tomorrow for further evaluation.

## 2024-03-14 NOTE — ED QUICK NOTES
Rounding Completed    Plan of Care reviewed. Waiting for CT results.  Elimination needs assessed.  Provided updates.    Bed is locked and in lowest position. Call light within reach.

## 2024-03-22 PROBLEM — J06.9 UPPER RESPIRATORY INFECTION: Status: RESOLVED | Noted: 2024-01-02 | Resolved: 2024-03-22

## 2024-03-29 ENCOUNTER — PATIENT MESSAGE (OUTPATIENT)
Dept: FAMILY MEDICINE CLINIC | Facility: CLINIC | Age: 39
End: 2024-03-29

## 2024-04-01 NOTE — TELEPHONE ENCOUNTER
From: Dwight Cardenas Jr.  To: Karmen Ellison  Sent: 3/29/2024 2:45 PM CDT  Subject: Sports physical     Im at [facility] to get a sports physical for [place] at [id number] she says they need a order or the sports physical and your nurse said we can go to any walk in clinic seeing if you can send it to them

## 2024-04-11 ENCOUNTER — TELEPHONE (OUTPATIENT)
Dept: FAMILY MEDICINE CLINIC | Facility: CLINIC | Age: 39
End: 2024-04-11

## 2024-04-11 RX ORDER — ALBUTEROL SULFATE 90 UG/1
2 AEROSOL, METERED RESPIRATORY (INHALATION) EVERY 4 HOURS PRN
Qty: 1 EACH | Refills: 0 | Status: SHIPPED | OUTPATIENT
Start: 2024-04-11 | End: 2024-05-11

## 2024-04-11 RX ORDER — FLUTICASONE PROPIONATE 50 MCG
2 SPRAY, SUSPENSION (ML) NASAL DAILY
Qty: 16 G | Refills: 0 | Status: SHIPPED | OUTPATIENT
Start: 2024-04-11 | End: 2024-07-10

## 2024-04-11 NOTE — TELEPHONE ENCOUNTER
This pt is asking if you are able to see him for some sick symptoms-he says the symptoms are similar to a chest cold/bronchitis since Karmen is OOV. He would really like to be seen. Explained that I could not guarantee this would happen and that he could be seen in the IC if his symptoms start to worsen.    He also has concerns for tongue pain and \"dots\". He was in the ER last month ffor this. He did show his ENT but the ENT was not concerned about this. I    In regards to tonsil and tongue issue. He did recently have a bx on the tonsil and the only result he got was that it's not cancer. I did encourage him to schedule a follow up appt w/ the ENT so that he could have further details and POC.    Pt is aware that if  can see him in the office it will be for sick symptoms only and that he really needs to follow up with ENT regarding the tonsil and tongue.

## 2024-04-11 NOTE — TELEPHONE ENCOUNTER
URGENT SICK CALL    Dwight Cardenas   LOV: 1/10/2024     Patient experiencing chest discomfort/cold feeling. Pt says he hasn't felt this feeling since he was smoking. Pt has quit smoking for some time now. Pt still having an issue with the feeling of something stuck in his throat and his tongue is swollen with red dots. Pt said he showed ENT who didn't seem concerned.   Duration/Onset of symptoms: 1 week    Pt wanted to schedule an appt with Karmen Ellison PA-C who is out of office on vacation. Pt says he's seen Dr. Montoya before and would like to see him.        Please advise.

## 2024-04-11 NOTE — TELEPHONE ENCOUNTER
Unable to fit him in today    Can use albuterol as needed for cough    Can also use flonase nasal spray nightly to help with potential post nasal drip    (Sent these both in)    If not improving or worsening, would recommend IC over the weekend    Thanks

## 2024-05-16 ENCOUNTER — PATIENT MESSAGE (OUTPATIENT)
Dept: FAMILY MEDICINE CLINIC | Facility: CLINIC | Age: 39
End: 2024-05-16

## 2024-05-16 NOTE — TELEPHONE ENCOUNTER
From: Dwight Cardenas Jr.  To: Karmen Ellison  Sent: 5/16/2024 1:24 PM CDT  Subject: Sent message on wrong one sorry    Sorry i sent the message on my daughters my chart by mistake..Im been tremendous lower back and thigh pain that goes to my my pelvic/bladder/ testicular area for like over a month i had a ultrasound of my testicular and it showed little stones i but seen uriologist n he dont think thats where the pain is coming from i have so much pain on my leg it radiates alex if its a kidney infection or what but it hurts to stand its hurts to lay too but way worse when i stand seeing what u think this is if u can squeeze me in tomorrow at like 12 if i can come go get blood work n give urine sample..Thanks

## 2024-05-20 ENCOUNTER — HOSPITAL ENCOUNTER (EMERGENCY)
Facility: HOSPITAL | Age: 39
Discharge: HOME | End: 2024-05-20
Attending: EMERGENCY MEDICINE

## 2024-05-20 ENCOUNTER — APPOINTMENT (OUTPATIENT)
Dept: CT IMAGING | Facility: HOSPITAL | Age: 39
End: 2024-05-20
Attending: EMERGENCY MEDICINE

## 2024-05-20 VITALS
OXYGEN SATURATION: 100 % | HEIGHT: 71 IN | HEART RATE: 71 BPM | DIASTOLIC BLOOD PRESSURE: 66 MMHG | SYSTOLIC BLOOD PRESSURE: 114 MMHG | BODY MASS INDEX: 27.02 KG/M2 | WEIGHT: 193 LBS | TEMPERATURE: 99 F | RESPIRATION RATE: 16 BRPM

## 2024-05-20 DIAGNOSIS — R13.10 DYSPHAGIA, UNSPECIFIED TYPE: Primary | ICD-10-CM

## 2024-05-20 DIAGNOSIS — K14.8 TONGUE MASS: ICD-10-CM

## 2024-05-20 LAB
ALBUMIN SERPL-MCNC: 3.6 G/DL (ref 3.4–5)
ALBUMIN/GLOB SERPL: 1.1 {RATIO} (ref 1–2)
ALP LIVER SERPL-CCNC: 50 U/L
ALT SERPL-CCNC: 13 U/L
ANION GAP SERPL CALC-SCNC: 6 MMOL/L (ref 0–18)
AST SERPL-CCNC: 8 U/L (ref 15–37)
BASOPHILS # BLD AUTO: 0.02 X10(3) UL (ref 0–0.2)
BASOPHILS NFR BLD AUTO: 0.4 %
BILIRUB SERPL-MCNC: 0.6 MG/DL (ref 0.1–2)
BUN BLD-MCNC: 9 MG/DL (ref 9–23)
CALCIUM BLD-MCNC: 8.9 MG/DL (ref 8.5–10.1)
CHLORIDE SERPL-SCNC: 111 MMOL/L (ref 98–112)
CO2 SERPL-SCNC: 24 MMOL/L (ref 21–32)
CREAT BLD-MCNC: 0.87 MG/DL
EGFRCR SERPLBLD CKD-EPI 2021: 112 ML/MIN/1.73M2 (ref 60–?)
EOSINOPHIL # BLD AUTO: 0.13 X10(3) UL (ref 0–0.7)
EOSINOPHIL NFR BLD AUTO: 2.7 %
ERYTHROCYTE [DISTWIDTH] IN BLOOD BY AUTOMATED COUNT: 11.8 %
GLOBULIN PLAS-MCNC: 3.2 G/DL (ref 2.8–4.4)
GLUCOSE BLD-MCNC: 103 MG/DL (ref 70–99)
HCT VFR BLD AUTO: 38.5 %
HGB BLD-MCNC: 12.8 G/DL
IMM GRANULOCYTES # BLD AUTO: 0.01 X10(3) UL (ref 0–1)
IMM GRANULOCYTES NFR BLD: 0.2 %
LYMPHOCYTES # BLD AUTO: 1.85 X10(3) UL (ref 1–4)
LYMPHOCYTES NFR BLD AUTO: 38.2 %
MCH RBC QN AUTO: 30.4 PG (ref 26–34)
MCHC RBC AUTO-ENTMCNC: 33.2 G/DL (ref 31–37)
MCV RBC AUTO: 91.4 FL
MONOCYTES # BLD AUTO: 0.46 X10(3) UL (ref 0.1–1)
MONOCYTES NFR BLD AUTO: 9.5 %
NEUTROPHILS # BLD AUTO: 2.37 X10 (3) UL (ref 1.5–7.7)
NEUTROPHILS # BLD AUTO: 2.37 X10(3) UL (ref 1.5–7.7)
NEUTROPHILS NFR BLD AUTO: 49 %
OSMOLALITY SERPL CALC.SUM OF ELEC: 291 MOSM/KG (ref 275–295)
PLATELET # BLD AUTO: 179 10(3)UL (ref 150–450)
POTASSIUM SERPL-SCNC: 3.7 MMOL/L (ref 3.5–5.1)
PROT SERPL-MCNC: 6.8 G/DL (ref 6.4–8.2)
RBC # BLD AUTO: 4.21 X10(6)UL
SODIUM SERPL-SCNC: 141 MMOL/L (ref 136–145)
WBC # BLD AUTO: 4.8 X10(3) UL (ref 4–11)

## 2024-05-20 PROCEDURE — 87430 STREP A AG IA: CPT | Performed by: EMERGENCY MEDICINE

## 2024-05-20 PROCEDURE — 70491 CT SOFT TISSUE NECK W/DYE: CPT | Performed by: EMERGENCY MEDICINE

## 2024-05-20 PROCEDURE — 99284 EMERGENCY DEPT VISIT MOD MDM: CPT

## 2024-05-20 PROCEDURE — 36415 COLL VENOUS BLD VENIPUNCTURE: CPT

## 2024-05-20 PROCEDURE — 80053 COMPREHEN METABOLIC PANEL: CPT | Performed by: EMERGENCY MEDICINE

## 2024-05-20 PROCEDURE — 99285 EMERGENCY DEPT VISIT HI MDM: CPT

## 2024-05-20 PROCEDURE — 85025 COMPLETE CBC W/AUTO DIFF WBC: CPT | Performed by: EMERGENCY MEDICINE

## 2024-05-20 RX ORDER — ESOMEPRAZOLE MAGNESIUM 40 MG/1
40 CAPSULE, DELAYED RELEASE ORAL
COMMUNITY

## 2024-05-20 NOTE — ED QUICK NOTES
Rounding Completed    Plan of Care reviewed. Waiting for CT results.  Elimination needs assessed.    Bed is locked and in lowest position. Call light within reach.

## 2024-05-20 NOTE — ED PROVIDER NOTES
Patient Seen in: [facility] Emergency Department      History     Chief Complaint   Patient presents with    Tonsil Problem    Swallowing Problem     Stated Complaint: somthing on his tounsil and it is hard to swallow.    Subjective:   HPI    39-year-old male without past medical history as below including GERD, anxiety, MVP presents reporting difficulty swallowing for the past 3 to 4 days feels like something is stuck in his throat.  He reports having a mass in his tongue for which she had a biopsy at [facility] about a month ago.  He states biopsies did not show cancer but they do not know the cause.  He reports some mild difficulty swallowing before but has increased significantly.  He reports difficulty swallowing his saliva.  He has mild pain in his throat.  Denies fever or chills.  Denies cough or cold symptoms.  Denies nausea or vomiting.        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     Other surgical history  June 2010 [facility]    lung BX     Shoulder arthroscopy                  Social History     Socioeconomic History    Marital status:    Tobacco Use    Smoking status: Former     Current packs/day: 0.50     Average packs/day: 0.5 packs/day for 10.0 years (5.0 ttl pk-yrs)     Types: Cigarettes    Smokeless tobacco: Never   Vaping Use    Vaping status: Never Used   Substance and Sexual Activity    Alcohol use: Never    Drug use: Never   Other Topics Concern     Service No    Blood Transfusions No    Caffeine Concern No    Occupational Exposure No    Hobby Hazards No    Sleep Concern Yes    Stress Concern No    Weight Concern No    Special Diet No    Back Care No    Exercise Yes    Bike Helmet No     Seat Belt Yes    Self-Exams No              Review of Systems    Positive for stated complaint: somthing on his tounsil and it is hard to swallow.  Other systems are as noted in HPI.  Constitutional and vital signs reviewed.      All other systems reviewed and negative except as noted above.    Physical Exam     ED Triage Vitals [05/20/24 0939]   /78   Pulse 78   Resp 16   Temp 99 °F (37.2 °C)   Temp src Oral   SpO2 99 %   O2 Device None (Room air)       Current Vitals:   Vital Signs  BP: 114/66  Pulse: 71  Resp: 16  Temp: 99 °F (37.2 °C)  Temp src: Oral  MAP (mmHg): 79    Oxygen Therapy  SpO2: 100 %  O2 Device: None (Room air)            Physical Exam  Vitals and nursing note reviewed.   Constitutional:       General: He is not in acute distress.     Appearance: He is well-developed. He is not ill-appearing.   HENT:      Head: Normocephalic and atraumatic.      Mouth/Throat:      Mouth: Mucous membranes are moist.      Comments: Posterior oropharynx with minimal erythema  Uvula midline with no peritonsillar fullness or asymmetry  No trismus  Handling secretions without difficulty  Eyes:      General: No scleral icterus.     Extraocular Movements: Extraocular movements intact.   Musculoskeletal:      Cervical back: Neck supple.   Skin:     General: Skin is warm and dry.      Capillary Refill: Capillary refill takes less than 2 seconds.   Neurological:      Mental Status: He is alert and oriented to person, place, and time.      GCS: GCS eye subscore is 4. GCS verbal subscore is 5. GCS motor subscore is 6.   Psychiatric:         Mood and Affect: Mood normal.         Behavior: Behavior normal.               ED Course     Labs Reviewed   COMP METABOLIC PANEL (14) - Abnormal; Notable for the following components:       Result Value    Glucose 103 (*)     AST 8 (*)     ALT 13 (*)     All other components within normal limits   CBC W/ DIFFERENTIAL - Abnormal; Notable for the following components:    RBC 4.21 (*)      HGB 12.8 (*)     HCT 38.5 (*)     All other components within normal limits   RAPID STREP A SCREEN (LC) - Normal    Narrative:     A confirmatory culture is recommended if clinically indicated.   CBC WITH DIFFERENTIAL WITH PLATELET    Narrative:     The following orders were created for panel order CBC With Differential With Platelet.  Procedure                               Abnormality         Status                     ---------                               -----------         ------                     CBC W/ DIFFERENTIAL[[id number]]          Abnormal            Final result                 Please view results for these tests on the individual orders.   RAINBOW DRAW LAVENDER   RAINBOW DRAW LIGHT GREEN             CT SOFT TISSUE OF NECK(CONTRAST ONLY) (CPT=70491)    Result Date: 5/20/2024  CONCLUSION:  1. There is soft tissue thickening or nodularity at the base of the tongue left of midline which could represent asymmetric tonsillar tissue.  Neoplasm here would not be excluded and direct visualization of this is recommended. 2. CT of neck is otherwise unremarkable.   LOCATION:  [facility]    Dictated by (CST): Zhen Hudson MD on 5/20/2024 at 11:22 AM     Finalized by (CST): Zhen Hudson MD on 5/20/2024 at 11:26 AM               MDM   39-year-old male without past medical history as below including GERD, anxiety, MVP presents reporting difficulty swallowing for the past 3 to 4 days feels like something is stuck in his throat.      Differential includes but is not limited to laryngeal or tonsillar mass, dysphagia, globus sensation    Chart review shows patient was seen at this ED on 3/13/2024 for tongue pain he had labs and a CT at that time.  Labs are unremarkable.  CT soft tissue neck showed mild diffuse enlargement of the lingual tonsils which is symmetric without focal mass.    Labs are unremarkable with normal WBC and negative strep.    Independent interpretation of CT soft tissue neck shows some slight  prominence and thickening at the base of the tongue on the left side.  Radiology report reviewed as above noting soft tissue thickening or nodularity which could represent asymmetric tonsillar tissue but neoplasm cannot be excluded.  Discussed with radiologist states that this looks similar in appearance to previous CT on 3/13/2024 but may be slightly more prominent.  States it is not causing any significant mass effect on surrounding structures.    Patient reports globus sensation and dysphagia but he has been able to eat eat and drink and is tolerating secretions.  Instructed to follow-up with his ENT doctor for further outpatient workup and management.  Return precaution discussed.      Medical Decision Making  Amount and/or Complexity of Data Reviewed  External Data Reviewed: labs, radiology and notes.     Details: See MDM  Labs: ordered. Decision-making details documented in ED Course.  Radiology: ordered and independent interpretation performed. Decision-making details documented in ED Course.        Disposition and Plan     Clinical Impression:  1. Dysphagia, unspecified type    2. Tongue mass         Disposition:  Discharge  5/20/2024 12:05 pm    Follow-up:  Kaleigh Ling DO  [id number] [facility] [address]  [facility] [id number] [phone]    Schedule an appointment as soon as possible for a visit            Medications Prescribed:  Current Discharge Medication List

## 2024-05-20 NOTE — ED INITIAL ASSESSMENT (HPI)
Pt states he had a tonsillar biopsy aprox 1.5 months ago for a mass.  Pt states since then having painful and difficulty swallowing.  Pt denies fevers.  Pt states he is to have a repeat scan 5/31.  Pt concerned mass is not improving

## 2024-06-07 ENCOUNTER — OFFICE VISIT (OUTPATIENT)
Facility: LOCATION | Age: 39
End: 2024-06-07
Payer: MEDICAID

## 2024-06-07 DIAGNOSIS — K14.8 TONGUE MASS: Primary | ICD-10-CM

## 2024-06-07 PROCEDURE — 99204 OFFICE O/P NEW MOD 45 MIN: CPT | Performed by: OTOLARYNGOLOGY

## 2024-06-07 PROCEDURE — 31575 DIAGNOSTIC LARYNGOSCOPY: CPT | Performed by: OTOLARYNGOLOGY

## 2024-06-07 NOTE — PROGRESS NOTES
Dwight Cardenas Jr. is a 39 year old male. No chief complaint on file.    HPI:   39-year-old white male apparently had a biopsy of base of tongue mass by another physician he was having mainly left-sided symptoms of left ear pain pressure he is a smoker and had a biopsy but unfortunately they think that the did the right side and that was negative.  He had a repeat PET scan which lit up the left base of tongue no cervical lymph nodes CT scan was otherwise unremarkable of the neck.  He is coming for second opinion.  Current Outpatient Medications   Medication Sig Dispense Refill    Esomeprazole Magnesium 40 MG Oral Capsule Delayed Release Take 1 capsule (40 mg total) by mouth every morning before breakfast.      fluticasone propionate 50 MCG/ACT Nasal Suspension 2 sprays by Each Nare route daily. 16 g 0    maalox/diphenhydramine/sucralfate Oral Suspension Take 10 mL by mouth 4 (four) times daily before meals and nightly. 100 mL 0    cyanocobalamin 1000 MCG/ML Injection Solution Inject 1 mL (1,000 mcg total) into the skin every 30 (thirty) days.      metoprolol tartrate 25 MG Oral Tab Take 1 tablet (25 mg total) by mouth 2 (two) times daily.      Magnesium Gluconate 250 MG Oral Tab Take 250 mg by mouth daily.      famotidine 20 MG Oral Tab Take 1 tablet (20 mg total) by mouth daily. (Patient not taking: Reported on 5/20/2024)        Past Medical History:    Anxiety    Esophageal reflux    Mitral valve prolapse    OTHER DISEASES    right pulmonary nodule - was found to be benign     OTHER DISEASES    HX childhood pneumonia      Social History:  Social History     Socioeconomic History    Marital status:    Tobacco Use    Smoking status: Former     Current packs/day: 0.50     Average packs/day: 0.5 packs/day for 10.0 years (5.0 ttl pk-yrs)     Types: Cigarettes    Smokeless tobacco: Never   Vaping Use    Vaping status: Never Used   Substance and Sexual Activity    Alcohol use: Never    Drug use: Never   Other  Topics Concern     Service No    Blood Transfusions No    Caffeine Concern No    Occupational Exposure No    Hobby Hazards No    Sleep Concern Yes    Stress Concern No    Weight Concern No    Special Diet No    Back Care No    Exercise Yes    Bike Helmet No    Seat Belt Yes    Self-Exams No      Past Surgical History:   Procedure Laterality Date    Angiogram      2017 - 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    System Pertinent findings Details   Constitutional  Overall appearance - Normal.   Psychiatric  Orientation - Oriented to time, place, person & situation. Appropriate mood and affect.   Head/Face  Facial features -- Normal. Skull - Normal.   Eyes  Pupils equal ,round ,react to light and accomidate   Ears  External Ear Right: Normal, Left: Normal. Canal - Right: Normal, Left: Normal. TM - Right: Normal left: Normal   Nose  External Nose, Normal, Septum deviated to the left nasal Vault, clear,Turbinates - Right: Normal left: Normal   Mouth/Throat  Lips/teeth/gums - Normal. Tonsils -0+ teen tonsils however has prominent left lingual tonsil see flexible laryngoscopy. Oropharynx - Normal.   Neck Exam  Inspection - Normal. Palpation - Normal. Parotid gland - Normal. Thyroid gland -normal   Neurological  Cranial nerves - Cranial nerves II through XII grossly intact.   Nasopharynx   Normal        Lymph Detail  Submental. Submandibular. Anterior cervical. Posterior cervical. Supraclavicular.     Flexible Laryngoscopy Procedure Note (18638)    Due to inability for adequate examination of the larynx and need for  magnification to perform the examination, endoscopy was performed.  Risks and benefits were discussed with patient/family and they have given verbal consent to proceed.    Pre-operative Diagnosis:   1. Tongue mass        Post-operative Diagnosis: Same    Procedure: Diagnostic flexible fiberoptic laryngoscopy    Anesthesia: Topical anesthetic [place]     Surgeon David Mcguire MD    EBL: 0    Procedure Detail & Findings: The patient was topically anesthetized within the nose, bilaterally.  The flexible laryngoscope was placed through the nostrils bilaterally.  The findings within the nose deviated to the left.  The findings within the nasopharynx clear.  The findings within the hypopharynx prominent left base of tongue lingual tonsil region.  The findings within the larynx normal            .    Condition: Stable    Complications: Patient tolerated the procedure well with no immediate complication.    David Zavala MD  PET scan from [facility] shows increased hypermetabolic activity lingual tonsil towards the left of midline.  Date 5/31/2024    ASSESSMENT AND PLAN:   1. Tongue mass  Actually is left base of tongue or lingual tonsil  Recommendation: Direct laryngoscopy biopsy left base of tongue, esophagoscopy general anesthesia has upcoming surgical date scheduled      The patient indicates understanding of these issues and agrees to the plan.      David Mcguire MD  6/7/2024  12:16 PM

## 2024-06-11 DIAGNOSIS — K14.8 TONGUE LESION: Primary | ICD-10-CM

## 2024-06-12 ENCOUNTER — EKG ENCOUNTER (OUTPATIENT)
Dept: LAB | Age: 39
End: 2024-06-12
Attending: OTOLARYNGOLOGY
Payer: MEDICAID

## 2024-06-12 DIAGNOSIS — K14.8 TONGUE LESION: ICD-10-CM

## 2024-06-12 LAB
ATRIAL RATE: 72 BPM
P AXIS: 48 DEGREES
P-R INTERVAL: 154 MS
Q-T INTERVAL: 382 MS
QRS DURATION: 102 MS
QTC CALCULATION (BEZET): 418 MS
R AXIS: -1 DEGREES
T AXIS: 27 DEGREES
VENTRICULAR RATE: 72 BPM

## 2024-06-12 PROCEDURE — 93010 ELECTROCARDIOGRAM REPORT: CPT | Performed by: INTERNAL MEDICINE

## 2024-06-12 PROCEDURE — 93005 ELECTROCARDIOGRAM TRACING: CPT

## 2024-06-17 NOTE — H&P
[facility]  History & Physical    Dwight Cardenas Jr. Patient Status:  Hospital Outpatient Surgery    1985 MRN [id number]   Location [facility] SURGERY Attending David Mcguire MD   Hosp Day # 0 PCP Kaleigh Ling DO     History of Present Illness:  Dwight Cardenas Jr. is a(n) 39 year old male.  Presenting for biopsy-excision of base of tongue enlargement left side patient is a smoker he had a PET scan that showed increased activity in this area previously biopsied the right side by another physician.  Doing direct laryngoscopy biopsy of left side of tongue and esophagoscopy    History:  Past Medical History:    Anesthesia complication    Extreme hiccups    Anxiety    Esophageal reflux    Mitral valve prolapse    Myocardial bridge (HCC)    OTHER DISEASES    right pulmonary nodule - was found to be benign     OTHER DISEASES    HX childhood pneumonia    Seizure disorder (HCC)    In childhood a couple seizures; around 9 years of age undetermined cause     Past Surgical History:   Procedure Laterality Date    Angiogram       - found to have myocardial bridge     Other surgical history  2010 [facility]    lung BX     Shoulder arthroscopy       Family History   Problem Relation Age of Onset    Other (lung cancer) Mother     Diabetes Father     Other (Quadruple bypass) Father     Colon Cancer Maternal Grandfather     Other (quad bypass) Paternal Grandfather       reports that he has quit smoking. His smoking use included cigarettes. He has a 5 pack-year smoking history. He has never used smokeless tobacco. He reports that he does not drink alcohol and does not use drugs.    Allergies:  Allergies   Allergen Reactions    No Known Allergies        Home Medications:  No medications prior to admission.       Physical Exam:   General: Alert, orientated x3.  Cooperative.  No apparent distress.  Vital Signs:  Ht 5' 11\" (1.803 m)   Wt 190 lb (86.2 kg)   BMI 26.50 kg/m²   Head:  Normocephalic  Eyes: PERRLA  Ears: TMs intact no fluid  Nose: Deviated septum to the left  Throat: 0+ tonsils prominent left lingual tonsil  Neck: No cervical lymphadenopathy  Lungs: Clear to auscultation bilaterally.  Cardiac: Regular rate and rhythm. No murmur.  Abdomen:  Soft, non-distended, non-tender, with no rebound or guarding.  No peritoneal signs. No ascites.  Liver is within normal limits.  Spleen is not palpable.    Extremities:  No lower extremity edema noted.  Without clubbing or cyanosis.    Skin: Normal texture and turgor.  Lymphatic:  No palpable cervical lymphadenopathy.  Neurologic: Cranial nerves are grossly intact.  Motor strength and sensory examination is grossly normal.  No focal neurologic deficit.    Laboratory Data:    Diagnosis: Base of tongue mass    Plan: Direct laryngoscopy biopsy of left base of tongue mass, esophagoscopy general anesthesia.  Patient Active Problem List   Diagnosis    History of prior cigarette smoking    Pulmonary nodule, right    Absence seizure (HCC)    Chest pain    Left hip pain    Chronic left-sided low back pain with left-sided sciatica    Cobalamin deficiency    Complaints of memory disturbance    Dental abscess    Gastroesophageal reflux disease    Mitral valve prolapse    Myocardial bridge (HCC)    Numbness and tingling in right hand    Numbness and tingling of left upper extremity    Pain in both lower legs    Palpitations    Chronic dyspnea    SOB (shortness of breath)    Tension type headache    Tremor, essential    Abnormal ANCA test    Coordination impairment    High total serum IgG               David Mcguire MD  6/17/2024  1:38 PM

## 2024-06-18 ENCOUNTER — ANESTHESIA EVENT (OUTPATIENT)
Dept: SURGERY | Facility: HOSPITAL | Age: 39
End: 2024-06-18

## 2024-06-20 ENCOUNTER — HOSPITAL ENCOUNTER (OUTPATIENT)
Facility: HOSPITAL | Age: 39
Setting detail: HOSPITAL OUTPATIENT SURGERY
Discharge: HOME | End: 2024-06-20
Attending: OTOLARYNGOLOGY | Admitting: OTOLARYNGOLOGY

## 2024-06-20 ENCOUNTER — ANESTHESIA (OUTPATIENT)
Dept: SURGERY | Facility: HOSPITAL | Age: 39
End: 2024-06-20

## 2024-06-20 VITALS
RESPIRATION RATE: 16 BRPM | OXYGEN SATURATION: 100 % | HEART RATE: 66 BPM | DIASTOLIC BLOOD PRESSURE: 73 MMHG | SYSTOLIC BLOOD PRESSURE: 116 MMHG | HEIGHT: 71 IN | TEMPERATURE: 99 F | WEIGHT: 190 LBS | BODY MASS INDEX: 26.6 KG/M2

## 2024-06-20 DIAGNOSIS — K14.8 TONGUE LESION: Primary | ICD-10-CM

## 2024-06-20 PROCEDURE — 0CBM8ZX EXCISION OF PHARYNX, VIA NATURAL OR ARTIFICIAL OPENING ENDOSCOPIC, DIAGNOSTIC: ICD-10-PCS | Performed by: OTOLARYNGOLOGY

## 2024-06-20 PROCEDURE — 88341 IMHCHEM/IMCYTCHM EA ADD ANTB: CPT | Performed by: OTOLARYNGOLOGY

## 2024-06-20 PROCEDURE — 88342 IMHCHEM/IMCYTCHM 1ST ANTB: CPT | Performed by: OTOLARYNGOLOGY

## 2024-06-20 PROCEDURE — 88305 TISSUE EXAM BY PATHOLOGIST: CPT | Performed by: OTOLARYNGOLOGY

## 2024-06-20 RX ORDER — ACETAMINOPHEN 500 MG
1000 TABLET ORAL ONCE AS NEEDED
Status: COMPLETED | OUTPATIENT
Start: 2024-06-20 | End: 2024-06-20

## 2024-06-20 RX ORDER — PROCHLORPERAZINE EDISYLATE 5 MG/ML
5 INJECTION INTRAMUSCULAR; INTRAVENOUS EVERY 8 HOURS PRN
Status: DISCONTINUED | OUTPATIENT
Start: 2024-06-20 | End: 2024-06-20

## 2024-06-20 RX ORDER — ONDANSETRON 2 MG/ML
4 INJECTION INTRAMUSCULAR; INTRAVENOUS EVERY 6 HOURS PRN
Status: DISCONTINUED | OUTPATIENT
Start: 2024-06-20 | End: 2024-06-20

## 2024-06-20 RX ORDER — HYDROMORPHONE HYDROCHLORIDE 1 MG/ML
INJECTION, SOLUTION INTRAMUSCULAR; INTRAVENOUS; SUBCUTANEOUS
Status: COMPLETED
Start: 2024-06-20 | End: 2024-06-20

## 2024-06-20 RX ORDER — NEOSTIGMINE METHYLSULFATE 1 MG/ML
INJECTION, SOLUTION INTRAVENOUS AS NEEDED
Status: DISCONTINUED | OUTPATIENT
Start: 2024-06-20 | End: 2024-06-20 | Stop reason: SURG

## 2024-06-20 RX ORDER — SODIUM CHLORIDE, SODIUM LACTATE, POTASSIUM CHLORIDE, CALCIUM CHLORIDE 600; 310; 30; 20 MG/100ML; MG/100ML; MG/100ML; MG/100ML
INJECTION, SOLUTION INTRAVENOUS CONTINUOUS
Status: DISCONTINUED | OUTPATIENT
Start: 2024-06-20 | End: 2024-06-20

## 2024-06-20 RX ORDER — ONDANSETRON 2 MG/ML
INJECTION INTRAMUSCULAR; INTRAVENOUS AS NEEDED
Status: DISCONTINUED | OUTPATIENT
Start: 2024-06-20 | End: 2024-06-20 | Stop reason: SURG

## 2024-06-20 RX ORDER — ROCURONIUM BROMIDE 10 MG/ML
INJECTION, SOLUTION INTRAVENOUS AS NEEDED
Status: DISCONTINUED | OUTPATIENT
Start: 2024-06-20 | End: 2024-06-20 | Stop reason: SURG

## 2024-06-20 RX ORDER — HYDROCODONE BITARTRATE AND ACETAMINOPHEN 5; 325 MG/1; MG/1
2 TABLET ORAL ONCE AS NEEDED
Status: COMPLETED | OUTPATIENT
Start: 2024-06-20 | End: 2024-06-20

## 2024-06-20 RX ORDER — GLYCOPYRROLATE 0.2 MG/ML
INJECTION, SOLUTION INTRAMUSCULAR; INTRAVENOUS AS NEEDED
Status: DISCONTINUED | OUTPATIENT
Start: 2024-06-20 | End: 2024-06-20 | Stop reason: SURG

## 2024-06-20 RX ORDER — HYDROCODONE BITARTRATE AND ACETAMINOPHEN 5; 325 MG/1; MG/1
1 TABLET ORAL ONCE AS NEEDED
Status: COMPLETED | OUTPATIENT
Start: 2024-06-20 | End: 2024-06-20

## 2024-06-20 RX ORDER — MIDAZOLAM HYDROCHLORIDE 1 MG/ML
INJECTION INTRAMUSCULAR; INTRAVENOUS AS NEEDED
Status: DISCONTINUED | OUTPATIENT
Start: 2024-06-20 | End: 2024-06-20 | Stop reason: SURG

## 2024-06-20 RX ORDER — LIDOCAINE HYDROCHLORIDE 10 MG/ML
INJECTION, SOLUTION EPIDURAL; INFILTRATION; INTRACAUDAL; PERINEURAL AS NEEDED
Status: DISCONTINUED | OUTPATIENT
Start: 2024-06-20 | End: 2024-06-20 | Stop reason: SURG

## 2024-06-20 RX ORDER — NALOXONE HYDROCHLORIDE 0.4 MG/ML
0.08 INJECTION, SOLUTION INTRAMUSCULAR; INTRAVENOUS; SUBCUTANEOUS AS NEEDED
Status: DISCONTINUED | OUTPATIENT
Start: 2024-06-20 | End: 2024-06-20

## 2024-06-20 RX ORDER — SCOLOPAMINE TRANSDERMAL SYSTEM 1 MG/1
1 PATCH, EXTENDED RELEASE TRANSDERMAL ONCE
Status: DISCONTINUED | OUTPATIENT
Start: 2024-06-20 | End: 2024-06-20 | Stop reason: HOSPADM

## 2024-06-20 RX ORDER — HYDROMORPHONE HYDROCHLORIDE 1 MG/ML
0.6 INJECTION, SOLUTION INTRAMUSCULAR; INTRAVENOUS; SUBCUTANEOUS EVERY 5 MIN PRN
Status: DISCONTINUED | OUTPATIENT
Start: 2024-06-20 | End: 2024-06-20

## 2024-06-20 RX ORDER — ACETAMINOPHEN 500 MG
1000 TABLET ORAL ONCE
Status: DISCONTINUED | OUTPATIENT
Start: 2024-06-20 | End: 2024-06-20 | Stop reason: HOSPADM

## 2024-06-20 RX ORDER — DEXAMETHASONE SODIUM PHOSPHATE 4 MG/ML
VIAL (ML) INJECTION AS NEEDED
Status: DISCONTINUED | OUTPATIENT
Start: 2024-06-20 | End: 2024-06-20 | Stop reason: SURG

## 2024-06-20 RX ORDER — HYDROMORPHONE HYDROCHLORIDE 1 MG/ML
0.2 INJECTION, SOLUTION INTRAMUSCULAR; INTRAVENOUS; SUBCUTANEOUS EVERY 5 MIN PRN
Status: DISCONTINUED | OUTPATIENT
Start: 2024-06-20 | End: 2024-06-20

## 2024-06-20 RX ORDER — HYDROMORPHONE HYDROCHLORIDE 1 MG/ML
0.4 INJECTION, SOLUTION INTRAMUSCULAR; INTRAVENOUS; SUBCUTANEOUS EVERY 5 MIN PRN
Status: DISCONTINUED | OUTPATIENT
Start: 2024-06-20 | End: 2024-06-20

## 2024-06-20 RX ADMIN — DEXAMETHASONE SODIUM PHOSPHATE 4 MG: 4 MG/ML VIAL (ML) INJECTION at 13:45:00

## 2024-06-20 RX ADMIN — LIDOCAINE HYDROCHLORIDE 50 MG: 10 INJECTION, SOLUTION EPIDURAL; INFILTRATION; INTRACAUDAL; PERINEURAL at 13:31:00

## 2024-06-20 RX ADMIN — ROCURONIUM BROMIDE 40 MG: 10 INJECTION, SOLUTION INTRAVENOUS at 13:36:00

## 2024-06-20 RX ADMIN — GLYCOPYRROLATE 0.8 MG: 0.2 INJECTION, SOLUTION INTRAMUSCULAR; INTRAVENOUS at 13:56:00

## 2024-06-20 RX ADMIN — SODIUM CHLORIDE, SODIUM LACTATE, POTASSIUM CHLORIDE, CALCIUM CHLORIDE: 600; 310; 30; 20 INJECTION, SOLUTION INTRAVENOUS at 14:04:00

## 2024-06-20 RX ADMIN — SODIUM CHLORIDE, SODIUM LACTATE, POTASSIUM CHLORIDE, CALCIUM CHLORIDE: 600; 310; 30; 20 INJECTION, SOLUTION INTRAVENOUS at 13:31:00

## 2024-06-20 RX ADMIN — NEOSTIGMINE METHYLSULFATE 4 MG: 1 INJECTION, SOLUTION INTRAVENOUS at 13:56:00

## 2024-06-20 RX ADMIN — ONDANSETRON 4 MG: 2 INJECTION INTRAMUSCULAR; INTRAVENOUS at 13:45:00

## 2024-06-20 RX ADMIN — MIDAZOLAM HYDROCHLORIDE 2 MG: 1 INJECTION INTRAMUSCULAR; INTRAVENOUS at 13:31:00

## 2024-06-20 NOTE — DISCHARGE INSTRUCTIONS
Soft diet  Pain meds as tolerated  Patient will likely have some blood in his saliva for 1 to 2 days.  Follow-up in office Tuesday or Wednesday

## 2024-06-20 NOTE — ANESTHESIA POSTPROCEDURE EVALUATION
[facility]    Dwight Cardenas Jr. Patient Status:  Hospital Outpatient Surgery   Age/Gender 39 year old male MRN [id number]   Location [facility] PERIOPERATIVE SERVICE Attending David Mcguire MD   Hosp Day # 0 PCP Kaleigh Ling DO       Anesthesia Post-op Note    Direct Laryngoscopy and Biopsy of Left Base of Tongue; Esophagoscopy    Procedure Summary       Date: 06/20/24 Room / Location:  MAIN OR 02 / [facility] MAIN OR    Anesthesia Start: 1331 Anesthesia Stop: 1414    Procedure: Direct Laryngoscopy and Biopsy of Left Base of Tongue; Esophagoscopy (Left: Mouth) Diagnosis:       Tongue lesion      (Tongue lesion [K14.8])    Surgeons: David Mcguire MD Anesthesiologist: Tree King MD    Anesthesia Type: general ASA Status: 2            Anesthesia Type: general    Vitals Value Taken Time   /72 06/20/24 1509   Temp 98.5 °F (36.9 °C) 06/20/24 1440   Pulse 55 06/20/24 1518   Resp 16 06/20/24 1500   SpO2 98 % 06/20/24 1518   Vitals shown include unfiled device data.    Patient Location: PACU    Anesthesia Type: general    Airway Patency: patent    Postop Pain Control: adequate    Mental Status: mildly sedated but able to meaningfully participate in the post-anesthesia evaluation    Nausea/Vomiting: none    Cardiopulmonary/Hydration status: stable euvolemic    Complications: no apparent anesthesia related complications    Postop vital signs: stable    Dental Exam: Unchanged from Preop    Patient to be discharged from PACU when criteria met.

## 2024-06-20 NOTE — ANESTHESIA PREPROCEDURE EVALUATION
PRE-OP EVALUATION    Patient Name: Dwight Cardenas Jr.    Admit Diagnosis: Tongue lesion [K14.8]    Pre-op Diagnosis: Tongue lesion [K14.8]    Direct Laryngoscopy and Biopsy of Left Base of Tongue; Esophagoscopy    Anesthesia Procedure: Direct Laryngoscopy and Biopsy of Left Base of Tongue; Esophagoscopy (Left: Mouth)    Surgeons and Role:     * David Mcguire MD - Primary    Pre-op vitals reviewed.  Temp: 97.7 °F (36.5 °C)  Pulse: 72  Resp: 18  BP: 114/64  SpO2: 99 %  Body mass index is 26.5 kg/m².    Current medications reviewed.  Hospital Medications:   [Transfer Hold] acetaminophen (Tylenol Extra Strength) tab 1,000 mg  1,000 mg Oral Once    [Transfer Hold] scopolamine (Transderm-Scop) 1 MG/3DAYS patch 1 patch  1 patch Transdermal Once    lactated ringers infusion   Intravenous Continuous       Outpatient Medications:     Medications Prior to Admission   Medication Sig Dispense Refill Last Dose    metoprolol tartrate 25 MG Oral Tab Take 1 tablet (25 mg total) by mouth daily.   6/19/2024    Magnesium Gluconate 250 MG Oral Tab Take 250 mg by mouth daily.   Past Month    Esomeprazole Magnesium 40 MG Oral Capsule Delayed Release Take 1 capsule (40 mg total) by mouth in the morning and 1 capsule (40 mg total) before bedtime.   Unknown    maalox/diphenhydramine/sucralfate Oral Suspension Take 10 mL by mouth 4 (four) times daily before meals and nightly. 100 mL 0 Unknown    cyanocobalamin 1000 MCG/ML Injection Solution Inject 1 mL (1,000 mcg total) into the skin every 30 (thirty) days.   Unknown       Allergies: No known allergies      Anesthesia Evaluation    Patient summary reviewed.    Anesthetic Complications  (-) history of anesthetic complications         GI/Hepatic/Renal      (+) GERD                           Cardiovascular  Comment: Hx myocardial bridge - per pt, follows with Dr. Fitch regularly, given no restrictions, no planned diagnostics or interventions, endorses excellent ET.  No changes in health,  no problems with anesthesia previously      Exercise tolerance: good     MET: >4                                           Endo/Other    Negative endo/other ROS.                              Pulmonary    Negative pulmonary ROS.                       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     Other surgical history  June 2010 [facility]    lung BX     Shoulder arthroscopy       Social History     Socioeconomic History    Marital status:    Tobacco Use    Smoking status: Former     Current packs/day: 0.50     Average packs/day: 0.5 packs/day for 10.0 years (5.0 ttl pk-yrs)     Types: Cigarettes    Smokeless tobacco: Never   Vaping Use    Vaping status: Never Used   Substance and Sexual Activity    Alcohol use: Never    Drug use: Never   Other Topics Concern     Service No    Blood Transfusions No    Caffeine Concern No    Occupational Exposure No    Hobby Hazards No    Sleep Concern Yes    Stress Concern No    Weight Concern No    Special Diet No    Back Care No    Exercise Yes    Bike Helmet No    Seat Belt Yes    Self-Exams No     History   Drug Use Unknown     Available pre-op labs reviewed.  Lab Results   Component Value Date    WBC 4.8 05/20/2024    RBC 4.21 (L) 05/20/2024    HGB 12.8 (L) 05/20/2024    HCT 38.5 (L) 05/20/2024    MCV 91.4 05/20/2024    MCH 30.4 05/20/2024    MCHC 33.2 05/20/2024    RDW 11.8 05/20/2024    .0 05/20/2024     Lab Results   Component Value Date     05/20/2024    K 3.7 05/20/2024     05/20/2024    CO2 24.0 05/20/2024    BUN 9 05/20/2024    CREATSERUM 0.87 05/20/2024     (H) 05/20/2024    CA 8.9 05/20/2024            Airway      Mallampati: II  Mouth opening: >3 FB  TM distance: 4 - 6 cm  Neck ROM: full  Cardiovascular    Cardiovascular exam normal.         Dental  Comment: No teresa loose           Pulmonary    Pulmonary exam normal.                 Other findings              ASA: 2   Plan: general  NPO status verified and patient meets guidelines.    Post-procedure pain management plan discussed with surgeon and patient.    Comment: Discussed r/b/a of general anesthesia including PONV, sore throat and hoarseness from airway manipulation, post-operative pain/discomfort, dental /lip injury, pressure/nerve injuries from positioning, recall, and other serious but rare complications. All questions answered, concerns addressed.      Plan/risks discussed with: patient and spouse      Consented to blood products.          Present on Admission:  **None**

## 2024-06-20 NOTE — OPERATIVE REPORT
[facility]  Op Note    Dwight Cardenas Jr. Location: OR   [facility] [id number] MRN [id number]   Admission Date 6/20/2024 Operation Date 6/20/2024   Attending Physician David Mcguire MD Operating Physician David Mcguire MD     Pre-Operative Diagnosis: Tongue lesion [K14.8]    Post-Operative Diagnosis: Same as above    Procedure Performed: Procedure(s):  Direct Laryngoscopy and Biopsy of Left Base of Tongue; Esophagoscopy    Surgeon: Surgeon(s):  David Mcguire MD     Anesthesia: General        Summary of Case: The patient was brought in the operating placed in the supine position brought under satisfactory limb anesthesia intubated prepped and draped first we did direct laryngoscopy using a Matt laryngoscope.  Surveying the hypopharynx larynx there was a prominence of the left lingual tonsil base of tongue region versus that to the right.  We then used the rigid esophagoscope and entered through the right piriform sinus surveying the upper third of the esophagus there were no masses or lesions we removed it without difficulty.  I then used the Mason laryngoscope and took multiple punch biopsies of the abnormality in the left lingual tonsil base of tongue region approximately 12 biopsies were done sent in formalin to the pathologist irrigation was achieved.  Patient was then awakened to recovery in stable condition tell procedure well no complications 2 cc blood loss    David Mcguire MD  6/20/2024  2:02 PM

## 2024-06-20 NOTE — BRIEF OP NOTE
Pre-Operative Diagnosis: Tongue lesion [K14.8]     Post-Operative Diagnosis: Tongue lesion [K14.8]      Procedure Performed:   Direct Laryngoscopy and Biopsy of Left Base of Tongue; Esophagoscopy    Surgeons and Role:     * David Mcguire MD - Primary    Assistant(s):        Surgical Findings: Prominent left lingual tonsil-base of tongue     Specimen: Multiple biopsies left lingual tonsil base of tongue     Estimated Blood Loss: Blood Output: 2 mL (6/20/2024  1:53 PM)      Dictation Number: 1    David Mcguire MD  6/20/2024  2:01 PM

## 2024-06-20 NOTE — ANESTHESIA PROCEDURE NOTES
Airway  Date/Time: 6/20/2024 1:37 PM  Urgency: elective      General Information and Staff    Patient location during procedure: OR  Anesthesiologist: rTee King MD  Performed: anesthesiologist   Performed by: Tree King MD  Authorized by: Tree King MD      Indications and Patient Condition  Indications for airway management: anesthesia  Spontaneous Ventilation: absent  Sedation level: deep  Preoxygenated: yes  Patient position: sniffing  Mask difficulty assessment: 1 - vent by mask    Final Airway Details  Final airway type: endotracheal airway      Successful airway: ETT  Cuffed: yes   Successful intubation technique: direct laryngoscopy  Endotracheal tube insertion site: oral  Blade: Kirk  Blade size: #3  ETT size (mm): 7.0    Cormack-Lehane Classification: grade I - full view of glottis  Placement verified by: capnometry   Cuff volume (mL): 5  Measured from: lips  ETT to lips (cm): 21  Number of attempts at approach: 1    Additional Comments  VC clean, passed smoothly, atraumatically. Dentition unchanged

## 2024-06-20 NOTE — INTERVAL H&P NOTE
.hpvPre-op Diagnosis: Tongue lesion [K14.8]  The above referenced H&P was reviewed by David Mcguire MD on 6/20/2024, the patient was examined and no significant changes have occurred in the patient's condition since the H&P was performed.  Risks and benefits were discussed, proceed with procedure as planned.    The above referenced H&P was reviewed by David Mcguire MD on 6/20/2024, the patient was examined and no significant changes have occurred in the patient's condition since the H&P was performed.  I discussed with the patient and/or legal representative the potential benefits, risks and side effects of this procedure; the likelihood of the patient achieving goals; and potential problems that might occur during recuperation.  I discussed reasonable alternatives to the procedure, including risks, benefits and side effects related to the alternatives and risks related to not receiving this procedure.  We will proceed with procedure as planned.

## 2024-06-26 ENCOUNTER — TELEPHONE (OUTPATIENT)
Facility: LOCATION | Age: 39
End: 2024-06-26

## 2024-06-26 NOTE — TELEPHONE ENCOUNTER
Given results over the phone that the biopsy of the base of tongue left side was negative we will going to follow this closely can you see him in a month and rescope him.

## 2024-07-01 ENCOUNTER — TELEPHONE (OUTPATIENT)
Facility: LOCATION | Age: 39
End: 2024-07-01

## 2024-07-01 RX ORDER — FLUTICASONE PROPIONATE 50 MCG
2 SPRAY, SUSPENSION (ML) NASAL DAILY
Qty: 16 G | Refills: 3 | Status: SHIPPED | OUTPATIENT
Start: 2024-07-01

## 2024-07-09 ENCOUNTER — PATIENT MESSAGE (OUTPATIENT)
Dept: FAMILY MEDICINE CLINIC | Facility: CLINIC | Age: 39
End: 2024-07-09

## 2024-07-17 ENCOUNTER — OFFICE VISIT (OUTPATIENT)
Facility: LOCATION | Age: 39
End: 2024-07-17
Payer: MEDICAID

## 2024-07-17 DIAGNOSIS — J35.1 LINGUAL TONSIL HYPERTROPHY: Primary | ICD-10-CM

## 2024-07-17 PROCEDURE — 99024 POSTOP FOLLOW-UP VISIT: CPT | Performed by: OTOLARYNGOLOGY

## 2024-07-17 RX ORDER — OMEPRAZOLE 40 MG/1
40 CAPSULE, DELAYED RELEASE ORAL 2 TIMES DAILY
Qty: 120 CAPSULE | Refills: 2 | Status: SHIPPED | OUTPATIENT
Start: 2024-07-17

## 2024-07-17 NOTE — PROGRESS NOTES
Patient is status post direct laryngoscopy and biopsy of left base of tongue what turns out to be lingual tonsil hypertrophy.  He has had fullness in the throat with referred ear fullness does claim to have quit smoking a year ago he is highly concerned about cancer saw another doctor where they biopsied the opposite side.      Exam: He does have lingual tonsillar hypertrophy no mass noted now the ears were normal  Plan: Biopsies taken 6/20/2024 were benign.  Ominous start him on treatment for reflux with omeprazole 40 mg twice daily for  Following up 6 to 8 weeks continued symptoms I might order a PET scan he had 1 prior which was done at another hospital

## 2024-07-18 ENCOUNTER — TELEPHONE (OUTPATIENT)
Facility: LOCATION | Age: 39
End: 2024-07-18

## 2024-07-18 DIAGNOSIS — D37.02 NEOPLASM OF UNCERTAIN BEHAVIOR OF BASE OF TONGUE: Primary | ICD-10-CM

## 2024-07-18 NOTE — TELEPHONE ENCOUNTER
Patient requesting PET scan not wishing to do therapy that I prescribed.  PET scan was ordered at his request

## 2024-07-22 ENCOUNTER — PATIENT MESSAGE (OUTPATIENT)
Dept: FAMILY MEDICINE CLINIC | Facility: CLINIC | Age: 39
End: 2024-07-22

## 2024-07-22 NOTE — TELEPHONE ENCOUNTER
From: Dwight Cardenas Jr.  To: Halley Montez  Sent: 7/22/2024 3:43 PM CDT  Subject: Forgot    Forgot my appointment was today anyway to reschedule for tomorrow anytime i was thinking it was tomorrow?

## 2024-07-26 ENCOUNTER — HOSPITAL ENCOUNTER (OUTPATIENT)
Dept: NUCLEAR MEDICINE | Facility: HOSPITAL | Age: 39
Discharge: HOME | End: 2024-07-26
Attending: OTOLARYNGOLOGY
Payer: MEDICAID

## 2024-07-26 DIAGNOSIS — D37.02 NEOPLASM OF UNCERTAIN BEHAVIOR OF BASE OF TONGUE: ICD-10-CM

## 2024-07-26 LAB — GLUCOSE BLD-MCNC: 111 MG/DL (ref 70–99)

## 2024-07-26 PROCEDURE — 82962 GLUCOSE BLOOD TEST: CPT

## 2024-07-26 PROCEDURE — 78815 PET IMAGE W/CT SKULL-THIGH: CPT | Performed by: OTOLARYNGOLOGY

## 2024-07-29 ENCOUNTER — TELEPHONE (OUTPATIENT)
Facility: LOCATION | Age: 39
End: 2024-07-29

## 2024-07-29 ENCOUNTER — TELEPHONE (OUTPATIENT)
Dept: FAMILY MEDICINE CLINIC | Facility: CLINIC | Age: 39
End: 2024-07-29

## 2024-07-29 NOTE — TELEPHONE ENCOUNTER
Dwight called because he wanted to see if he could move up his 7/29 appointment with Dr. Montez an hour earlier. I apologized and told him that we do not have any earlier availability today since Dr. Montez has a full schedule. He asked to see another provider but patients are unable to be seen by multiple providers per office policy to keep continuity of care. He was requesting to see Karmen Ellison but she is not available for over a month. He also asked to see Dr. Ike Montoya. Although Dwight said he saw him before, I am unable to see any office visits with Ike Montoya in his past visits.    He proceeded to yell and swear at me. He said he would want to cancel the 7/29 appointment.

## 2024-07-29 NOTE — TELEPHONE ENCOUNTER
Dr. Mcguire patient. Pt called. Pt had PET done 7/26/24- ordered by Dr. Mcguire. Pt states 'would like to know what Dr. Mcguire wants to do about the neck lymph nodes that are measuring 2.7?' Pt aware Dr. Mcguire is currently out of the office and requested message to be sent to MD rodriguez. Kalpesh      Pt [phone]

## 2024-07-30 NOTE — TELEPHONE ENCOUNTER
PET scan reviewed.  This suggests a growth in the base of tongue.  Patient is status post biopsy by Dr. Mcguire which was negative.  I have asked him to see Dr. Louise who is a head and neck oncologist through [facility] for an opinion.

## 2024-08-12 ENCOUNTER — TELEPHONE (OUTPATIENT)
Facility: LOCATION | Age: 39
End: 2024-08-12

## 2024-08-12 NOTE — TELEPHONE ENCOUNTER
I talked to the patient regarding the finding on the PET scan lighting up the left base of tongue.  This is area we recently biopsied and we had discussed prior to this ordering of this test that this could light up based on inflammation from the most recent biopsy.  I have also referred him into Dr. Louise head neck surgeon at [facility].  I called Dr. Louise and we had a in-depth conversation regarding this patient she is going to see him back she already saw him a week ago.  She is going to follow him up in approximately a month I talked her about possibly going back and rebiopsy in the area.  I then called the patient back and gave him this information he seemed to be understanding as to what the plan is and is willing to go along with that.

## 2024-08-20 ENCOUNTER — OFFICE VISIT (OUTPATIENT)
Dept: FAMILY MEDICINE CLINIC | Facility: CLINIC | Age: 39
End: 2024-08-20
Payer: MEDICAID

## 2024-08-20 VITALS
TEMPERATURE: 98 F | RESPIRATION RATE: 18 BRPM | OXYGEN SATURATION: 98 % | DIASTOLIC BLOOD PRESSURE: 60 MMHG | WEIGHT: 191 LBS | BODY MASS INDEX: 26.74 KG/M2 | HEART RATE: 66 BPM | HEIGHT: 71 IN | SYSTOLIC BLOOD PRESSURE: 100 MMHG

## 2024-08-20 DIAGNOSIS — R59.9 LYMPHOID HYPERPLASIA: ICD-10-CM

## 2024-08-20 DIAGNOSIS — R10.9 ABDOMINAL CRAMPING: ICD-10-CM

## 2024-08-20 DIAGNOSIS — H69.92 ACUTE DYSFUNCTION OF LEFT EUSTACHIAN TUBE: Primary | ICD-10-CM

## 2024-08-20 DIAGNOSIS — K21.9 GASTROESOPHAGEAL REFLUX DISEASE, UNSPECIFIED WHETHER ESOPHAGITIS PRESENT: ICD-10-CM

## 2024-08-20 DIAGNOSIS — R26.89 BALANCE PROBLEM: ICD-10-CM

## 2024-08-20 PROCEDURE — 99214 OFFICE O/P EST MOD 30 MIN: CPT | Performed by: FAMILY MEDICINE

## 2024-08-20 RX ORDER — FLUTICASONE PROPIONATE 50 MCG
SPRAY, SUSPENSION (ML) NASAL
Qty: 3 EACH | Refills: 0 | Status: SHIPPED | OUTPATIENT
Start: 2024-08-20

## 2024-08-20 RX ORDER — DICYCLOMINE HYDROCHLORIDE 10 MG/1
10 CAPSULE ORAL 4 TIMES DAILY
Qty: 120 CAPSULE | Refills: 0 | Status: SHIPPED | OUTPATIENT
Start: 2024-08-20

## 2024-08-20 NOTE — PATIENT INSTRUCTIONS
Use the Flonase 2 sprays each nostril once daily after shower for at least the next 2 weeks or as long as you are congested.    Take the dicyclomine 10 mg every 6 hours as needed for abdominal cramps.    Continue your medications as prescribed.    Make an appointment with physical therapy.    Make an appointment with your GI doctor.    Make an appointment with the [facility].    Keep your appointment with Dr. Louise.

## 2024-08-20 NOTE — PROGRESS NOTES
The 21st Century Cures Act makes medical notes like these available to patients in the interest of transparency. Please be advised this is a medical document. Medical documents are intended to carry relevant information, facts as evident, and the clinical opinion of the practitioner. The medical note is intended as peer to peer communication and may appear blunt or direct. It is written in medical language and may contain abbreviations or verbiage that are unfamiliar.       Dwight Cardenas Jr. is a 39 year old male.    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  The patient has been seen by Dr. Mcguire, ENT who biopsied a tongue lesion in June 2024.  This was read as lymphoid hyperplasia but no malignancy.  He has had a previous biopsy a year ago which showed the same thing.  The patient has subsequently seen Dr. Louise at [facility] at the request of Dr. Mcguire because the patient's most recent PET scan showed abnormal uptake involving left paramidline, 2.3 cm mass at the base of the tongue highly concerning for malignancy.  Dr. Louise wants to review all of his imaging before making a decision as to whether or not this lesion needs to be rebiopsied.  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  The patient is very frustrated because nobody can explain why he has this lymphoid hyperplasia.  He states for months he has been feeling fatigue.  He has intermittent muscle pain in his legs.  He now has left ear discomfort which is affecting his balance.  He had seen another physician last week for SI joint pain and was treated with a 5-day course of steroids.  He states this did help with his pain.  He is not having any paresthesias, numbness or weakness into the legs.  He denies  having any falls or vertigo with his sense of being off balance.  He notices it when he is driving especially if he has to drive along a curve. He also admits to a squeezing sensation across his abdomen which happens multiple times a day, multiple times a week.  It is not associated with any nausea, vomiting, diarrhea, constipation.  He has severe acid reflux despite taking Nexium 40 mg twice daily.  He has had no blood in the urine or stools or melena.  He had an EGD he thinks a year ago and is wondering if he needs another one.  It has now been recommended that the patient be seen at the [facility] for further assessment.  The patient is wondering if there is any additional testing which I could order or any medications he could get to help with his symptoms.    HISTORY:  Past Medical History:    Anesthesia complication    Extreme hiccups    Anxiety    Esophageal reflux    Mitral valve prolapse    Myocardial bridge (HCC)    OTHER DISEASES    right pulmonary nodule - was found to be benign     OTHER DISEASES    HX childhood pneumonia    Seizure disorder (HCC)    In childhood a couple seizures; around 9 years of age undetermined cause      Past Surgical History:   Procedure Laterality Date    Angiogram      2017 - found to have myocardial bridge     Other surgical history  June 2010 [facility]    lung BX     Shoulder arthroscopy        Family History   Problem Relation Age of Onset    Other (lung cancer) Mother     Cancer Mother         Lung cancer    Diabetes Father     Other (Quadruple bypass) Father     Colon Cancer Maternal Grandfather     Other (quad bypass) Paternal Grandfather       Social History:   Social History     Socioeconomic History    Marital status:    Tobacco Use    Smoking status: Former     Current packs/day: 0.50     Average packs/day: 0.5 packs/day for 10.0 years (5.0 ttl pk-yrs)     Types: Cigarettes    Smokeless tobacco: Never   Vaping Use    Vaping status: Never Used    Substance and Sexual Activity    Alcohol use: Not Currently    Drug use: Never   Other Topics Concern     Service No    Blood Transfusions No    Caffeine Concern No    Occupational Exposure No    Hobby Hazards No    Sleep Concern Yes    Stress Concern No    Weight Concern No    Special Diet No    Back Care No    Exercise Yes    Bike Helmet No    Seat Belt Yes    Self-Exams No     Social Determinants of Health      Received from [facility]    Housing Stability        Medications (Active prior to today's visit):  Current Outpatient Medications   Medication Sig Dispense Refill    dicyclomine 10 MG Oral Cap Take 1 capsule (10 mg total) by mouth 4 (four) times daily. 120 capsule 0    fluticasone propionate 50 MCG/ACT Nasal Suspension 2 sprays to each nostril once daily after shower. 3 each 0    Omeprazole 40 MG Oral Capsule Delayed Release Take 1 capsule (40 mg total) by mouth in the morning and 1 capsule (40 mg total) before bedtime. Before a meal. 120 capsule 2    metoprolol tartrate 25 MG Oral Tab Take 1 tablet (25 mg total) by mouth daily.         Allergies:  Allergies   Allergen Reactions    No Known Allergies        ROS:     Pertinent positives and pertinent negatives are as listed in HPI.    All other review of symptoms were reviewed and negative.    PHYSICAL EXAM:   /60 (BP Location: Left arm, Patient Position: Sitting, Cuff Size: adult)   Pulse 66   Temp 97.8 °F (36.6 °C)   Resp 18   Ht 5' 11\" (1.803 m)   Wt 191 lb (86.6 kg)   SpO2 98%   BMI 26.64 kg/m²     Wt Readings from Last 3 Encounters:   08/20/24 191 lb (86.6 kg)   06/20/24 190 lb (86.2 kg)   05/20/24 193 lb (87.5 kg)       BP Readings from Last 3 Encounters:   08/20/24 100/60   06/20/24 116/73   05/20/24 114/66       General: Well-nourished, well hydrated. No acute distress. No pallor.   HEENT: Normocephalic, atraumatic.  SHAILA, EOMI, Sclera clear and non icteric bilaterally. TM's normal no fluid is noted, nose  without congestion, pharynx with mild redness or exudates. Moist mucous membranes.  Neck: Supple. No lymphadenopathy. No thyromegaly. No bruits noted.  Heart: RRR without S3 or S4 or murmur.  Lungs: Clear to auscultation bilaterally. No rales, rhonchi or wheezes. No tachypnea or retractions noted.  Abdomen: Soft, nontender, nondistended, normal bowel sounds. No organomegaly. No guarding, rigidity or rebound noted.  Extremities: No edema bilaterally.  Skin: Warm and dry. Multiple tattoos noted.  Neuro: Alert and oriented x 3, normal gait.  Psych: Normal mood and affect.     ASSESSMENT/PLAN:   39 year old male with    LABS This Visit:    Results for orders placed or performed during the hospital encounter of 07/26/24   POCT Glucose    Collection Time: 07/26/24  7:27 AM   Result Value Ref Range    POC Glucose 111 (H) 70 - 99 mg/dL        1. Acute dysfunction of left eustachian tube    The patient has had some mild nasal congestion and sinus pressure.  I am recommending he restart Flonase 2 sprays to each nostril once daily until his symptoms have resolved.    - fluticasone propionate 50 MCG/ACT Nasal Suspension; 2 sprays to each nostril once daily after shower.  Dispense: 3 each; Refill: 0    2. Abdominal cramping    The patient had CT of the abdomen and pelvis in April 2023 which showed normal findings except mild bladder wall thickening.  I am uncertain as to the cause of his abdominal cramping.  I said we could try Bentyl 10 mg 4 times daily as needed cramping to see if this gives him some relief.  The patient was agreeable to trying it.    - dicyclomine 10 MG Oral Cap; Take 1 capsule (10 mg total) by mouth 4 (four) times daily.  Dispense: 120 capsule; Refill: 0    3. Gastroesophageal reflux disease, unspecified whether esophagitis present    GERD protocol was reviewed with the patient.  He should continue with the Nexium 40 mg twice daily.  He should follow-up with his gastroenterologist since his symptoms are  worsening.    4. Balance problem    The patient's balance problems may be related to the eustachian tube dysfunction, however, he states he has this even when his ear is not bothering him.  I have referred him to physical therapy for assessment.    - OP REFERRAL TO [facility]    5. Lymphoid hyperplasia    Extensive review of all the laboratory and imaging results which the patient has undergone.  I told the patient I cannot think of any other testing I would order for him.  He should keep his appointment with Dr. Louise to determine if he needs to have another biopsy of this lesion in his tongue.  And he should keep his appointment with [facility].  He thinks it will be scheduled for the second week of September.     Health Maintenance:    Health Maintenance   Topic Date Due    COVID-19 Vaccine (3 - 2023- season) 2025 (Originally 2023)    Pneumococcal Vaccine: Birth to 64yrs (1 of 2 - PCV) 01/10/2025 (Originally 1990)    DTaP,Tdap,and Td Vaccines (1 - Tdap) 01/10/2025 (Originally 2003)    Influenza Vaccine (1) 10/01/2024    Annual Physical  01/10/2025    Annual Depression Screening  Completed         Meds This Visit:  Requested Prescriptions     Signed Prescriptions Disp Refills    dicyclomine 10 MG Oral Cap 120 capsule 0     Sig: Take 1 capsule (10 mg total) by mouth 4 (four) times daily.    fluticasone propionate 50 MCG/ACT Nasal Suspension 3 each 0     Si sprays to each nostril once daily after shower.       Imaging & Referrals:  OP REFERRAL TO [facility]     Patient understands plan and follow-up.  Follow up with PCP after seen by [facility].    2024  Halley Montez DO    Total time: 30 minutes including precharting, H&P, plan of care    This dictation was performed with a verbal recognition program (DRAGON) and it was checked for errors. It is possible that there are still dictated errors within this office note. If so, please  bring any errors to my attention for an addendum. All efforts were made to ensure that this office note is accurate

## 2024-09-04 ENCOUNTER — TELEPHONE (OUTPATIENT)
Dept: FAMILY MEDICINE CLINIC | Facility: CLINIC | Age: 39
End: 2024-09-04

## 2024-09-04 NOTE — TELEPHONE ENCOUNTER
Pre Surgical Appointment      DOS: 9/20/24  Location: [facility]  Surgeon: Jackie Louise  Procedure: Biopsy - Mass of Oropharynx   Pre-op Appointment Date: 9/10/24    Orders received, check list started and placed in MA's basket.

## 2024-09-10 ENCOUNTER — TELEPHONE (OUTPATIENT)
Dept: FAMILY MEDICINE CLINIC | Facility: CLINIC | Age: 39
End: 2024-09-10

## 2024-09-10 ENCOUNTER — OFFICE VISIT (OUTPATIENT)
Dept: FAMILY MEDICINE CLINIC | Facility: CLINIC | Age: 39
End: 2024-09-10
Payer: COMMERCIAL

## 2024-09-10 VITALS
OXYGEN SATURATION: 99 % | HEIGHT: 71 IN | SYSTOLIC BLOOD PRESSURE: 98 MMHG | TEMPERATURE: 99 F | HEART RATE: 68 BPM | RESPIRATION RATE: 18 BRPM | DIASTOLIC BLOOD PRESSURE: 60 MMHG | BODY MASS INDEX: 26.74 KG/M2 | WEIGHT: 191 LBS

## 2024-09-10 DIAGNOSIS — Q24.5: ICD-10-CM

## 2024-09-10 DIAGNOSIS — G40.A09 ABSENCE SEIZURE (HCC): ICD-10-CM

## 2024-09-10 DIAGNOSIS — R06.02 SOB (SHORTNESS OF BREATH): ICD-10-CM

## 2024-09-10 DIAGNOSIS — J39.2 MASS OF OROPHARYNX: ICD-10-CM

## 2024-09-10 DIAGNOSIS — J35.1 LINGUAL TONSIL HYPERTROPHY: ICD-10-CM

## 2024-09-10 DIAGNOSIS — R30.0 DYSURIA: ICD-10-CM

## 2024-09-10 DIAGNOSIS — R94.31 ABNORMAL EKG: ICD-10-CM

## 2024-09-10 DIAGNOSIS — Z01.818 PREOP EXAMINATION: Primary | ICD-10-CM

## 2024-09-10 LAB
APPEARANCE: CLEAR
ATRIAL RATE: 63 BPM
BILIRUBIN: NEGATIVE
GLUCOSE (URINE DIPSTICK): NEGATIVE MG/DL
KETONES (URINE DIPSTICK): NEGATIVE MG/DL
LEUKOCYTES: NEGATIVE
MULTISTIX LOT#: NORMAL NUMERIC
NITRITE, URINE: NEGATIVE
OCCULT BLOOD: NEGATIVE
P AXIS: 44 DEGREES
P-R INTERVAL: 164 MS
PH, URINE: 7 (ref 4.5–8)
PROTEIN (URINE DIPSTICK): NEGATIVE MG/DL
Q-T INTERVAL: 408 MS
QRS DURATION: 74 MS
QTC CALCULATION (BEZET): 417 MS
R AXIS: -14 DEGREES
SPECIFIC GRAVITY: 1.01 (ref 1–1.03)
T AXIS: 5 DEGREES
URINE-COLOR: YELLOW
UROBILINOGEN,SEMI-QN: 0.2 MG/DL (ref 0–1.9)
VENTRICULAR RATE: 63 BPM

## 2024-09-10 PROCEDURE — 99214 OFFICE O/P EST MOD 30 MIN: CPT | Performed by: FAMILY MEDICINE

## 2024-09-10 PROCEDURE — 3074F SYST BP LT 130 MM HG: CPT | Performed by: FAMILY MEDICINE

## 2024-09-10 PROCEDURE — 93000 ELECTROCARDIOGRAM COMPLETE: CPT | Performed by: FAMILY MEDICINE

## 2024-09-10 PROCEDURE — 3078F DIAST BP <80 MM HG: CPT | Performed by: FAMILY MEDICINE

## 2024-09-10 PROCEDURE — 3008F BODY MASS INDEX DOCD: CPT | Performed by: FAMILY MEDICINE

## 2024-09-10 PROCEDURE — 81003 URINALYSIS AUTO W/O SCOPE: CPT | Performed by: FAMILY MEDICINE

## 2024-09-10 NOTE — PROGRESS NOTES
The 21st Century Cures Act makes medical notes like these available to patients in the interest of transparency. Please be advised this is a medical document. Medical documents are intended to carry relevant information, facts as evident, and the clinical opinion of the practitioner. The medical note is intended as peer to peer communication and may appear blunt or direct. It is written in medical language and may contain abbreviations or verbiage that are unfamiliar.     Dwight Cardenas Jr. is a 39 year old male who presents for a pre-operative physical exam. Patient is to have Diagnostic laryngoscopy with biopsy, to be done by Dr. Jackie Louise at [facility] on 10/04/2024.      Prior anesthesia Yes no complications  PMH negative for angina, arrhythmia, CAD, CHF, liver disease, kidney disease, no coagulation delay, no primary hypercoagulability, no PE or DVT history, no jaw problems, no asthma.    Smoking history Former smoker, quit 1.5 years ago  Alcohol use No  TODD risk No    No family history of adverse reaction to anesthesia, no aneurysm, no bleeding problems, no clotting disorder, no sudden cardiac death, no stroke or ischemic heart disease    HPI:   Pt complains of left sided otalgia but currently no fever or sore throat. Admits to occasional chills and some left lower abdominal/pelvic discomfort. States his urine was very dark this morning and he had some dysuria. He denies any N/V/D/C, change in bowel habits, blood in the urine or stool.    He has a complex history which started in 8/2023 with left ear pain. He developed swelling of the tongue in 2/24. He has  been seen by several ENT providers and had 2 biopsies. The first one was on the right side and was negative for malignancy. His most recent biopsy done on 6/20/2024 showed lingual tonsillar hypertrophy without a discrete mass and the biopsy was consistent with tonsillar hypertrophy, no malignancy noted. He has had 3 PET/CT scans,  most recently on 7/26/2024 which showed \"abnormal uptake involving the left paramidline, 2.3 cm mass at the base of the tongue.\"    He scheduled to be seen at [facility] on 9/27/2024 for a constellation of symptoms which have included abdominal pain, fatigue, fevers, eye pain, headaches, disequilibrium, paresthesias, memory issues.  He has been seen by multiple specialists including rheumatology, endocrinology and neurology and the workup has suggested an inflammatory syndrome but no clear diagnosis has been made.    The patient has a past history for myocardial bridge.  He is under the care of Dr. Fitch.  He takes metoprolol 25 mg daily.  He gets occasional palpitations but is currently denying any chest pain.  He does admit to shortness of breath which has been chronic. He stopped smoking 1.5 years ago.  He is not having any dizziness or syncope.  He states he had an EKG done in June which showed some abnormalities.  He was supposed to follow-up with Dr. Fitch.  However, he was given an appointment with his nurse practitioner and the patient did not want to be seen by the nurse practitioner, so he never followed up with cardiology.  He has no past history for hypertension, coronary artery disease, diabetes.  His last lipid panel 1/11/2024 showed total cholesterol 190, HDL 68, triglycerides 69, .    He states he has a past history for absence seizure.  He has not had any recent episodes.  He is not currently on any medication for it.    Current Outpatient Medications   Medication Sig Dispense Refill    dicyclomine 10 MG Oral Cap Take 1 capsule (10 mg total) by mouth 4 (four) times daily. 120 capsule 0    fluticasone propionate 50 MCG/ACT Nasal Suspension 2 sprays to each nostril once daily after shower. 3 each 0    Omeprazole 40 MG Oral Capsule Delayed Release Take 1 capsule (40 mg total) by mouth in the morning and 1 capsule (40 mg total) before bedtime. Before a meal. 120 capsule 2    metoprolol  tartrate 25 MG Oral Tab Take 1 tablet (25 mg total) by mouth daily.        Allergies:   Allergies   Allergen Reactions    No Known Allergies       Past Medical History:    Anesthesia complication    Extreme hiccups    Anxiety    Esophageal reflux    Mitral valve prolapse    Myocardial bridge (HCC)    OTHER DISEASES    right pulmonary nodule - was found to be benign     OTHER DISEASES    HX childhood pneumonia    Seizure disorder (HCC)    In childhood a couple seizures; around 9 years of age undetermined cause      Past Surgical History:   Procedure Laterality Date    Angiogram      2017 - found to have myocardial bridge     Other surgical history  June 2010 [facility]    lung BX     Shoulder arthroscopy        Family History   Problem Relation Age of Onset    Other (lung cancer) Mother     Cancer Mother         Lung cancer    Diabetes Father     Other (Quadruple bypass) Father     Colon Cancer Maternal Grandfather     Other (quad bypass) Paternal Grandfather       Social History:   Social History     Socioeconomic History    Marital status:    Tobacco Use    Smoking status: Former     Current packs/day: 0.50     Average packs/day: 0.5 packs/day for 10.0 years (5.0 ttl pk-yrs)     Types: Cigarettes    Smokeless tobacco: Never   Vaping Use    Vaping status: Never Used   Substance and Sexual Activity    Alcohol use: Not Currently    Drug use: Never   Other Topics Concern     Service No    Blood Transfusions No    Caffeine Concern No    Occupational Exposure No    Hobby Hazards No    Sleep Concern Yes    Stress Concern No    Weight Concern No    Special Diet No    Back Care No    Exercise Yes    Bike Helmet No    Seat Belt Yes    Self-Exams No     Social Determinants of Health      Received from [facility]    Housing Stability           REVIEW OF SYSTEMS:     ROS negative except as stated above.    EXAM:   BP 98/60 (BP Location: Left arm, Patient Position: Sitting, Cuff Size:  adult)   Pulse 68   Temp 98.6 °F (37 °C)   Resp 18   Ht 5' 11\" (1.803 m)   Wt 191 lb (86.6 kg)   SpO2 99%   BMI 26.64 kg/m²         General: WH/WN/WD, in NAD, A and O times 3  HEAD: Normocephalic, atraumatic  EYES: SHAILA, EOMI, conjunctiva normal, sclera anicteric  EARS: Tympanic membranes normal, EAC's normal.  NOSE: Turbninates normal, no bleeding noted.  PHARYNX:  No eythema or exudates, mucous membrane moist, airway patent.  NECK:  No cervical lymphadenopathy or thyromegaly, No bruits noted.  HEART: Regular rate and rhythm, no S3, S4 or murmur noted.  LUNGS: Clear to ausculation. No retractions or tachypnea noted.  ABDOMEN: Soft, mild LLQ tenderness, no guarding, rigidity or rebound, no masses or hepatosplenomegaly, normal bowel sounds in all four quadrants.  EXTREMITIES: No clubbing, cyanosis, edema noted. DTR's +2/4 in all 4 extremities. Motor +5/5 in all 4 extremities.  SKIN: Warm and dry. Multiple tattoos noted.  NEURO: Cr. N. II-XII intact, normal gait  PSYCH: Normal mood and affect.      ASSESSMENT AND PLAN:     1. Preop examination  2. Lingual tonsil hypertrophy  3. Mass of oropharynx    The patient is scheduled to have diagnostic laryngoscopy with biopsy on 10/4/2024 by Dr. Jackie Louise.    4. Myocardial bridge (HCC)  5. Abnormal EKG  6. SOB (shortness of breath)    Recent Results (from the past 24 hour(s))   ELECTROCARDIOGRAM, COMPLETE    Collection Time: 09/10/24  9:42 AM   Result Value Ref Range    Ventricular rate 63 BPM    Atrial rate 63 BPM    P-R Interval 164 ms    QRS Duration 74 ms    Q-T Interval 408 ms    QTC Calculation (Bezet) 417 ms    P Axis 44 degrees    R Axis -14 degrees    T Axis 5 degrees   Urine Dip, auto without Micro    Collection Time: 09/10/24  9:51 AM   Result Value Ref Range    Glucose Urine Negative Negative mg/dL    Bilirubin Urine Negative Negative    Ketones, UA Negative Negative - Trace mg/dL    Spec Gravity 1.010 1.005 - 1.030    Blood Urine Negative Negative     PH Urine 7.0 5.0 - 8.0    Protein Urine Negative Negative - Trace mg/dL    Urobilinogen Urine 0.2 0.2 - 1.0 mg/dL    Nitrite Urine Negative Negative    Leukocyte Esterase Urine Negative Negative    APPEARANCE clear Clear    Color Urine yellow Yellow    Multistix Lot# [id number] Numeric    Multistix Expiration Date 04/30/25 Date       EKG shows a normal sinus rhythm with a rate of 74.  There are T wave abnormalities noted anterior laterally which were present on his previous EKG dated 6/12/2024.  These are essentially unchanged.  The patient has chronic shortness of breath and intermittent palpitations.  I told the patient he needs to be seen by Dr. Fitch for cardiac clearance.  We faxed a copy of the EKG to Dr. Fitch's office today.    - ELECTROCARDIOGRAM, COMPLETE    7. Absence seizure (HCC)    Patient is asymptomatic, not on any medication and has not had any episodes.    8. Dysuria    Patient informed that UA is normal. Advised to stay well hydrated and limit caffeine intake.    - Urine Dip, auto without Micro    Preop labs reviewed from 9/3/2024.  CBC, CMP, PT and PTT are all normal.    Await cardiac clearance prior to giving final surgical clearance.    This dictation was performed with a verbal recognition program (DRAGON) and it was checked for errors. It is possible that there are still dictated errors within this office note. If so, please bring any errors to my attention for an addendum. All efforts were made to ensure that this office note is accurate

## 2024-09-10 NOTE — TELEPHONE ENCOUNTER
Patient requesting for EKG done at office today 9/10/2024 to be faxed to  at   fax # [phone]

## 2024-09-11 ENCOUNTER — HOSPITAL ENCOUNTER (OUTPATIENT)
Facility: HOSPITAL | Age: 39
Setting detail: OBSERVATION
Discharge: HOME | End: 2024-09-12
Attending: EMERGENCY MEDICINE | Admitting: HOSPITALIST
Payer: COMMERCIAL

## 2024-09-11 DIAGNOSIS — R94.31 ABNORMAL EKG: ICD-10-CM

## 2024-09-11 DIAGNOSIS — R10.9 ABDOMINAL CRAMPING: ICD-10-CM

## 2024-09-11 DIAGNOSIS — R00.2 PALPITATIONS: Primary | ICD-10-CM

## 2024-09-11 LAB
ALBUMIN SERPL-MCNC: 4.5 G/DL (ref 3.2–4.8)
ALBUMIN/GLOB SERPL: 1.6 {RATIO} (ref 1–2)
ALP LIVER SERPL-CCNC: 59 U/L
ALT SERPL-CCNC: 9 U/L
ANION GAP SERPL CALC-SCNC: 5 MMOL/L (ref 0–18)
AST SERPL-CCNC: 11 U/L (ref ?–34)
BASOPHILS # BLD AUTO: 0.04 X10(3) UL (ref 0–0.2)
BASOPHILS NFR BLD AUTO: 0.5 %
BILIRUB SERPL-MCNC: 0.3 MG/DL (ref 0.3–1.2)
BUN BLD-MCNC: 12 MG/DL (ref 9–23)
CALCIUM BLD-MCNC: 9.4 MG/DL (ref 8.7–10.4)
CHLORIDE SERPL-SCNC: 106 MMOL/L (ref 98–112)
CO2 SERPL-SCNC: 27 MMOL/L (ref 21–32)
CREAT BLD-MCNC: 0.96 MG/DL
CRP SERPL-MCNC: 0.6 MG/DL (ref ?–0.5)
EGFRCR SERPLBLD CKD-EPI 2021: 102 ML/MIN/1.73M2 (ref 60–?)
EOSINOPHIL # BLD AUTO: 0.21 X10(3) UL (ref 0–0.7)
EOSINOPHIL NFR BLD AUTO: 2.7 %
ERYTHROCYTE [DISTWIDTH] IN BLOOD BY AUTOMATED COUNT: 12.5 %
ERYTHROCYTE [SEDIMENTATION RATE] IN BLOOD: 14 MM/HR
GLOBULIN PLAS-MCNC: 2.9 G/DL (ref 2–3.5)
GLUCOSE BLD-MCNC: 86 MG/DL (ref 70–99)
HCT VFR BLD AUTO: 40.2 %
HGB BLD-MCNC: 13.7 G/DL
IMM GRANULOCYTES # BLD AUTO: 0.02 X10(3) UL (ref 0–1)
IMM GRANULOCYTES NFR BLD: 0.3 %
LYMPHOCYTES # BLD AUTO: 2.87 X10(3) UL (ref 1–4)
LYMPHOCYTES NFR BLD AUTO: 36.3 %
MCH RBC QN AUTO: 30.7 PG (ref 26–34)
MCHC RBC AUTO-ENTMCNC: 34.1 G/DL (ref 31–37)
MCV RBC AUTO: 90.1 FL
MONOCYTES # BLD AUTO: 0.68 X10(3) UL (ref 0.1–1)
MONOCYTES NFR BLD AUTO: 8.6 %
NEUTROPHILS # BLD AUTO: 4.09 X10 (3) UL (ref 1.5–7.7)
NEUTROPHILS # BLD AUTO: 4.09 X10(3) UL (ref 1.5–7.7)
NEUTROPHILS NFR BLD AUTO: 51.6 %
OSMOLALITY SERPL CALC.SUM OF ELEC: 285 MOSM/KG (ref 275–295)
PLATELET # BLD AUTO: 234 10(3)UL (ref 150–450)
POTASSIUM SERPL-SCNC: 4.3 MMOL/L (ref 3.5–5.1)
PROT SERPL-MCNC: 7.4 G/DL (ref 5.7–8.2)
RBC # BLD AUTO: 4.46 X10(6)UL
SODIUM SERPL-SCNC: 138 MMOL/L (ref 136–145)
TROPONIN I SERPL HS-MCNC: <3 NG/L
WBC # BLD AUTO: 7.9 X10(3) UL (ref 4–11)

## 2024-09-11 PROCEDURE — 99223 1ST HOSP IP/OBS HIGH 75: CPT | Performed by: HOSPITALIST

## 2024-09-11 RX ORDER — METOPROLOL SUCCINATE 25 MG/1
25 TABLET, EXTENDED RELEASE ORAL 2 TIMES DAILY
COMMUNITY
End: 2024-09-12

## 2024-09-11 RX ORDER — PROCHLORPERAZINE EDISYLATE 5 MG/ML
5 INJECTION INTRAMUSCULAR; INTRAVENOUS EVERY 8 HOURS PRN
Status: DISCONTINUED | OUTPATIENT
Start: 2024-09-11 | End: 2024-09-12

## 2024-09-11 RX ORDER — METOPROLOL TARTRATE 50 MG
50 TABLET ORAL ONCE AS NEEDED
Status: DISCONTINUED | OUTPATIENT
Start: 2024-09-12 | End: 2024-09-11

## 2024-09-11 RX ORDER — METOPROLOL TARTRATE 50 MG
50 TABLET ORAL ONCE
Status: DISCONTINUED | OUTPATIENT
Start: 2024-09-12 | End: 2024-09-11

## 2024-09-11 RX ORDER — NITROGLYCERIN 0.4 MG/1
0.4 TABLET SUBLINGUAL ONCE
Status: COMPLETED | OUTPATIENT
Start: 2024-09-11 | End: 2024-09-12

## 2024-09-11 RX ORDER — PANTOPRAZOLE SODIUM 40 MG/1
40 TABLET, DELAYED RELEASE ORAL
Status: DISCONTINUED | OUTPATIENT
Start: 2024-09-12 | End: 2024-09-12

## 2024-09-11 RX ORDER — ONDANSETRON 2 MG/ML
4 INJECTION INTRAMUSCULAR; INTRAVENOUS EVERY 6 HOURS PRN
Status: DISCONTINUED | OUTPATIENT
Start: 2024-09-11 | End: 2024-09-12

## 2024-09-11 RX ORDER — METOPROLOL TARTRATE 50 MG
50 TABLET ORAL ONCE AS NEEDED
Status: DISCONTINUED | OUTPATIENT
Start: 2024-09-11 | End: 2024-09-11

## 2024-09-11 RX ORDER — METOPROLOL TARTRATE 50 MG
50 TABLET ORAL ONCE
Status: DISCONTINUED | OUTPATIENT
Start: 2024-09-11 | End: 2024-09-11

## 2024-09-11 RX ORDER — METOPROLOL TARTRATE 100 MG
100 TABLET ORAL ONCE AS NEEDED
Status: DISCONTINUED | OUTPATIENT
Start: 2024-09-12 | End: 2024-09-11

## 2024-09-11 RX ORDER — MELATONIN
3 NIGHTLY PRN
Status: DISCONTINUED | OUTPATIENT
Start: 2024-09-11 | End: 2024-09-12

## 2024-09-11 RX ORDER — METOPROLOL SUCCINATE 25 MG/1
25 TABLET, EXTENDED RELEASE ORAL
Status: DISCONTINUED | OUTPATIENT
Start: 2024-09-11 | End: 2024-09-12

## 2024-09-11 RX ORDER — METOPROLOL TARTRATE 1 MG/ML
5 INJECTION, SOLUTION INTRAVENOUS SEE ADMIN INSTRUCTIONS
Status: DISCONTINUED | OUTPATIENT
Start: 2024-09-11 | End: 2024-09-12 | Stop reason: HOSPADM

## 2024-09-11 RX ORDER — METOPROLOL TARTRATE 100 MG
100 TABLET ORAL ONCE AS NEEDED
Status: DISCONTINUED | OUTPATIENT
Start: 2024-09-11 | End: 2024-09-11

## 2024-09-11 RX ORDER — DILTIAZEM HYDROCHLORIDE 5 MG/ML
5 INJECTION INTRAVENOUS SEE ADMIN INSTRUCTIONS
Status: DISCONTINUED | OUTPATIENT
Start: 2024-09-11 | End: 2024-09-12 | Stop reason: HOSPADM

## 2024-09-11 RX ORDER — METOPROLOL TARTRATE 100 MG
100 TABLET ORAL ONCE
Status: DISCONTINUED | OUTPATIENT
Start: 2024-09-11 | End: 2024-09-11

## 2024-09-11 RX ORDER — ECHINACEA PURPUREA EXTRACT 125 MG
1 TABLET ORAL
Status: DISCONTINUED | OUTPATIENT
Start: 2024-09-11 | End: 2024-09-12

## 2024-09-11 RX ORDER — METOPROLOL TARTRATE 25 MG/1
25 TABLET, FILM COATED ORAL DAILY
Status: DISCONTINUED | OUTPATIENT
Start: 2024-09-12 | End: 2024-09-12

## 2024-09-11 RX ORDER — FLUTICASONE PROPIONATE 50 MCG
1 SPRAY, SUSPENSION (ML) NASAL DAILY
Status: DISCONTINUED | OUTPATIENT
Start: 2024-09-11 | End: 2024-09-12

## 2024-09-11 RX ORDER — METOPROLOL TARTRATE 100 MG
100 TABLET ORAL ONCE
Status: DISCONTINUED | OUTPATIENT
Start: 2024-09-12 | End: 2024-09-11

## 2024-09-11 RX ORDER — ALBUTEROL SULFATE 90 UG/1
2 INHALANT RESPIRATORY (INHALATION) EVERY 4 HOURS PRN
COMMUNITY

## 2024-09-11 RX ORDER — SIMVASTATIN 20 MG
20 TABLET ORAL NIGHTLY
COMMUNITY
End: 2024-09-11 | Stop reason: CLARIF

## 2024-09-11 RX ORDER — DICYCLOMINE HYDROCHLORIDE 10 MG/1
10 CAPSULE ORAL 4 TIMES DAILY
Status: DISCONTINUED | OUTPATIENT
Start: 2024-09-11 | End: 2024-09-12

## 2024-09-11 RX ORDER — ATORVASTATIN CALCIUM 10 MG/1
10 TABLET, FILM COATED ORAL NIGHTLY
Status: DISCONTINUED | OUTPATIENT
Start: 2024-09-11 | End: 2024-09-12

## 2024-09-11 RX ORDER — ACETAMINOPHEN 500 MG
500 TABLET ORAL EVERY 4 HOURS PRN
Status: DISCONTINUED | OUTPATIENT
Start: 2024-09-11 | End: 2024-09-12

## 2024-09-11 NOTE — ED PROVIDER NOTES
Patient Seen in: [facility] Emergency Department      History     Chief Complaint   Patient presents with    Arrythmia/Palpitations     Stated Complaint: felt like his heart stopped last night, shortness of breath    Subjective:   HPI    Mr. Ronald Pro reported experiencing cardiac symptoms, leading his cardiologist to recommend hospital admission. He described having palpitations, especially during sleep, which wake him up. Yesterday, he felt as if his heart had stopped momentarily while asleep, which was unusual for him as he usually feels a constant tightness in his chest. After the palpitations, he felt a \"weird\" sensation of his heart not beating, accompanied by shortness of breath. He mentioned having two abnormal EKGs, the first abnormalities since his heart condition diagnosis. Initially thought to be mitral valve prolapse, his condition was later identified as a myocardial bridge. Over the past month, these symptoms have woken him up about five times, but yesterday's episode was particularly alarming. He also mentioned feeling constantly tired for the past year and a half. He reported a mass at the base of his tongue, in the middle of his throat, which hasn't been removed due to the abnormal EKGs. He does report getting short of breath with activity.    Objective:   Past Medical History:    Anesthesia complication    Extreme hiccups    Anxiety    Esophageal reflux    Mitral valve prolapse    Myocardial bridge (HCC)    OTHER DISEASES    right pulmonary nodule - was found to be benign     OTHER DISEASES    HX childhood pneumonia    Seizure disorder (HCC)    In childhood a couple seizures; around 9 years of age undetermined cause              Past Surgical History:   Procedure Laterality Date    Angiogram      2017 - found to have myocardial bridge     Other surgical history  June 2010 [facility]    lung      Shoulder arthroscopy                  Social History     Socioeconomic History     Marital status:    Tobacco Use    Smoking status: Former     Current packs/day: 0.50     Average packs/day: 0.5 packs/day for 10.0 years (5.0 ttl pk-yrs)     Types: Cigarettes    Smokeless tobacco: Never   Vaping Use    Vaping status: Never Used   Substance and Sexual Activity    Alcohol use: Not Currently    Drug use: Never   Other Topics Concern     Service No    Blood Transfusions No    Caffeine Concern No    Occupational Exposure No    Hobby Hazards No    Sleep Concern Yes    Stress Concern No    Weight Concern No    Special Diet No    Back Care No    Exercise Yes    Bike Helmet No    Seat Belt Yes    Self-Exams No     Social Determinants of Health      Received from [facility]    Housing Stability              Review of Systems    Positive for stated Chief Complaint: Arrythmia/Palpitations    Other systems are as noted in HPI.  Constitutional and vital signs reviewed.      All other systems reviewed and negative except as noted above.    Physical Exam     ED Triage Vitals [09/11/24 1510]   /85   Pulse 80   Resp 18   Temp 97.2 °F (36.2 °C)   Temp src Temporal   SpO2 97 %   O2 Device None (Room air)       Current Vitals:   Vital Signs  BP: 124/74  Pulse: 73  Resp: 16  Temp: 97.2 °F (36.2 °C)  Temp src: Temporal    Oxygen Therapy  SpO2: 100 %  O2 Device: None (Room air)            Physical Exam    General: Alert and oriented x3 in no acute distress.  HEENT: Normocephalic, atraumatic, pupils equal round and reactive to light.  Neck: Supple.  Cardiovascular: Regular rate and rhythm, no murmurs.  Respiratory: Lungs clear to auscultation.  Extremities: No CCE.  Skin: Warm and dry.    ED Course     Labs Reviewed   COMP METABOLIC PANEL (14) - Abnormal; Notable for the following components:       Result Value    ALT 9 (*)     All other components within normal limits   TROPONIN I HIGH SENSITIVITY - Normal   CBC WITH DIFFERENTIAL WITH PLATELET   RAINBOW DRAW BLUE           I  discussed the patient's case with Luz Maria Camarillo from [facility].  She reported that Dr. Rodriguez had wanted to admit the patient for a coronary CTA and an echo.  She will put the orders in.         MDM      Patient presents with palpitations and abnormal EKG.  Differential diagnosis includes but is not limited to arrhythmia, electrolyte abnormality and acute coronary syndrome.  The patient's EKG from his cardiology office today was reviewed.  He does have a nonspecific T wave abnormality that appears similar to his previous EKG from June of this year.  He is in a sinus rhythm.  He has remained in a sinus rhythm on the monitor.  His labs are normal including his troponin and electrolytes.  He will be admitted per cardiology recommendations.  I discussed the case also with Dr. Burgos from the hospitalist service who has agreed to admit him primarily and is here to see him.    Admission disposition: 9/11/2024  5:56 PM                                Medical Decision Making      Disposition and Plan     Clinical Impression:  1. Palpitations    2. Abnormal EKG         Disposition:  Admit  9/11/2024  5:56 pm    Follow-up:  No follow-up provider specified.        Medications Prescribed:  Current Discharge Medication List                            Hospital Problems       Present on Admission  Date Reviewed: 9/10/2024            ICD-10-CM Noted POA    * (Principal) Palpitations R00.2 7/21/2020 Unknown

## 2024-09-11 NOTE — ED INITIAL ASSESSMENT (HPI)
ZEHRA x 1 week, palpitations, pt reports \"I feel like my heart stopped\" while he was sleeping last night, pt saw cardiologist Dr. Rodriguez who recommended admission, pt c/o chest tightness currently and ZEHRA, denies pain at this time, EKG done at cardiologist's office

## 2024-09-11 NOTE — TELEPHONE ENCOUNTER
Spoke with patient. He requested his EKG to be faxed to Dr Gaona at [facility]. EKG printed and faxed to number provided by patient. [phone].

## 2024-09-12 ENCOUNTER — APPOINTMENT (OUTPATIENT)
Dept: CV DIAGNOSTICS | Facility: HOSPITAL | Age: 39
End: 2024-09-12
Attending: HOSPITALIST
Payer: COMMERCIAL

## 2024-09-12 ENCOUNTER — APPOINTMENT (OUTPATIENT)
Dept: CT IMAGING | Facility: HOSPITAL | Age: 39
End: 2024-09-12
Attending: HOSPITALIST
Payer: COMMERCIAL

## 2024-09-12 VITALS
WEIGHT: 188.25 LBS | RESPIRATION RATE: 18 BRPM | HEIGHT: 71 IN | BODY MASS INDEX: 26.35 KG/M2 | HEART RATE: 67 BPM | OXYGEN SATURATION: 98 % | DIASTOLIC BLOOD PRESSURE: 64 MMHG | SYSTOLIC BLOOD PRESSURE: 111 MMHG | TEMPERATURE: 97 F

## 2024-09-12 PROCEDURE — 99239 HOSP IP/OBS DSCHRG MGMT >30: CPT | Performed by: HOSPITALIST

## 2024-09-12 PROCEDURE — 75574 CT ANGIO HRT W/3D IMAGE: CPT | Performed by: HOSPITALIST

## 2024-09-12 PROCEDURE — 93306 TTE W/DOPPLER COMPLETE: CPT | Performed by: HOSPITALIST

## 2024-09-12 RX ORDER — DILTIAZEM HYDROCHLORIDE 120 MG/1
120 CAPSULE, COATED, EXTENDED RELEASE ORAL DAILY
Qty: 30 CAPSULE | Refills: 1 | Status: SHIPPED | OUTPATIENT
Start: 2024-09-13

## 2024-09-12 RX ORDER — NITROGLYCERIN 0.4 MG/1
TABLET SUBLINGUAL
Status: DISCONTINUED
Start: 2024-09-12 | End: 2024-09-12

## 2024-09-12 RX ORDER — METOPROLOL TARTRATE 1 MG/ML
INJECTION, SOLUTION INTRAVENOUS
Status: DISCONTINUED
Start: 2024-09-12 | End: 2024-09-12

## 2024-09-12 RX ORDER — ATORVASTATIN CALCIUM 10 MG/1
10 TABLET, FILM COATED ORAL NIGHTLY
Qty: 30 TABLET | Refills: 1 | Status: SHIPPED | OUTPATIENT
Start: 2024-09-12

## 2024-09-12 RX ORDER — DILTIAZEM HYDROCHLORIDE 120 MG/1
120 CAPSULE, EXTENDED RELEASE ORAL DAILY
Status: DISCONTINUED | OUTPATIENT
Start: 2024-09-13 | End: 2024-09-12

## 2024-09-12 NOTE — PLAN OF CARE
Assumed pt care at 0730. A&O x 4. On room air. Reports chronic chest tightness, MD aware. Denies pain. Vital signs stable, NSR/SB on tele. Up independently.     Plan of care: echo, CTA gated.    Plan of care updated with patient and wife. Questions answered, pt verbalized understanding. Call light is within reach. All needs met at this time.    Problem: Patient/Family Goals  Goal: Patient/Family Long Term Goal  Description: Patient's Long Term Goal: Stay out of hospital    Interventions:  - Attend all follow-up appointments  - Adhere to medication regimen  - See additional Care Plan goals for specific interventions  Outcome: Progressing  Goal: Patient/Family Short Term Goal  Description: Patient's Short Term Goal: Go home    Interventions:   - Cardiology consult  - Echocardiogram  - Gated CTA  - See additional Care Plan goals for specific interventions  Outcome: Progressing

## 2024-09-12 NOTE — IMAGING NOTE
Dwight Cardenas Jr. to CT Rm 4.   Denies use of long acting nitrates like, Imdur, Cialis, Levitra and Viagra.   O2 applied via nasal cannula @ 2LPM.  Procedure explained and questions answered.   GFR = 102 9/11/24 at 1519  Contrast injected at 0838  Contrast injection = 85ml  0.9 NS flush = 100ml  Average HR = 64bpm    Patient tolerated the procedure without complication. Denies any contrast reaction.   Escorted pt back to 8610 via transporter, discharged from CT in stable condition.

## 2024-09-12 NOTE — DISCHARGE SUMMARY
[facility]     Dwight Cardenas Jr. Patient Status:  Observation    1985 MRN [id number]   Location [facility] 8NE-A Attending Kevan Ledezma MD   Hosp Day # 0 PCP Kaleigh Ling DO     Date of Admission: 2024  Date of Discharge:   24    Discharge Disposition: Home or Self Care    Discharge Diagnosis:  #Palpitations, ?pauses, h/o myocardial bridge  Initial ischemic w/u negative  Gated CT clean. No bridging despite cath from  w/ bridging?  Plan outpt cMRI  Possible vasospasm, bb changed to diltiazem  Echo normal EF  Cont home BB  Cardiology to eval     #Mass at base of tongue  Scheduled for biopsy with Dr. Jackie Louise once cleared     #GERD with progressive worsening symptoms  out GI eval for EGD once cleared  Already on PPi BID    #ANUPAM pulm nodule 3mm - stable from July  - pt in detail. Needs f/u CT 3 months. Defer to PCP      #DL, statin    History of Present Illness: Dwight Cardenas Jr. is a 39 year old male with past medical history significant for myocardial bridge presents to the ER with c/o palpitations.  Pt has a mass at the base of his tongue which was biopsied earlier this year per ENT.  Results were c/w lingual tonsil hypertrophy and was started on Omeprazole twice daily.  He then had a PET scan at his request which showed increased uptake in the region of left base of tongue.  He was told this could be due to inflammation from recent biopsy but ultimately pt was referred to head and neck surgeon Dr. Ruff at [facility] for repeat laryngoscopy with biopsy.  Prior to this procedure, cardiac clearance was requested and pt went to see cardiology today.  Cardiologist advised pt to obtain 2D echo along with gated CTA for preop clearnace.  Pt states he has also been feeling his heart stop, particularly while he is sleeping.  He states this has occurred moreso over the past 2-3 nights and was very bad yesterday night and awoke him from sleep.  He also  has multiple other complaints including fatigue, lethargy, abdominal pain, scrotal pressure, lower and upper extremity weakness.  It appears he has had extensive endocrine and rheumatological work up without a clear diagnosis.  He also c/o worsening reflux symptoms and states he had black diarrhea once and would like this checked out as well with EGD/colonoscopy.       Brief Synopsis: pt w/ chest pain. Went for gated CTA which was clean and interestingly did not show myocardial bridging which was noted on angio in 2015 at OSH. Plan c MRI as outpt. R/o vasospasm and bb changed to dilt at MD. Needs biopsy of tongue mass and EGD once gets cardiac clearance. Pulm nodule needs f/u w/ repeat CT as outpt.     Lace+ Score: 33  59-90 High Risk  29-58 Medium Risk  0-28   Low Risk       TCM Follow-Up Recommendation:  LACE 29-58: Moderate Risk of readmission after discharge from the hospital.      Procedures during hospitalization:   Gated CTA.     Incidental or significant findings and recommendations (brief descriptions):  Pulm nodule - f/u CT as outpt w/ PCP. D/w pt    Lab/Test results pending at Discharge:   none    Consultants:  cards    Discharge Medication List:     Discharge Medications        START taking these medications        Instructions Prescription details   atorvastatin 10 MG Tabs  Commonly known as: Lipitor  Replaces: simvastatin 20 MG Tabs      Take 1 tablet (10 mg total) by mouth nightly.   Quantity: 30 tablet  Refills: 1     dilTIAZem  MG Cp24  Commonly known as: Cardizem CD  Start taking on: September 13, 2024      Take 1 capsule (120 mg total) by mouth daily.   Quantity: 30 capsule  Refills: 1            CHANGE how you take these medications        Instructions Prescription details   fluticasone propionate 50 MCG/ACT Susp  Commonly known as: Flonase  What changed:   how much to take  how to take this  when to take this  reasons to take this  additional instructions      2 sprays to each nostril once  daily after shower.   Quantity: 3 each  Refills: 0            CONTINUE taking these medications        Instructions Prescription details   albuterol 108 (90 Base) MCG/ACT Aers  Commonly known as: Ventolin HFA      Inhale 2 puffs into the lungs every 4 (four) hours as needed for Wheezing.   Refills: 0     dicyclomine 10 MG Caps  Commonly known as: Bentyl      Take 1 capsule (10 mg total) by mouth 4 (four) times daily.   Quantity: 120 capsule  Refills: 0     Omeprazole 40 MG Cpdr      Take 1 capsule (40 mg total) by mouth in the morning and 1 capsule (40 mg total) before bedtime. Before a meal.   Quantity: 120 capsule  Refills: 2            STOP taking these medications      simvastatin 20 MG Tabs  Commonly known as: Zocor  Replaced by: atorvastatin 10 MG Tabs        Toprol XL 25 MG Tb24  Generic drug: metoprolol succinate ER                  Where to Get Your Medications        These medications were sent to [id number] - [place], [place] - [address] AT [facility], [phone], [phone]  [facility] [address] [facility] [address]      Hours: 24-hours Phone: [phone]   atorvastatin 10 MG Tabs  dilTIAZem  MG Cp24         ILPM reviewed: yes    Follow-up appointment:   Cristofer Rodriguez MD  [address] [place] [id number] [phone]    Follow up in 2 week(s)  Our office will call to schedule appointment    Kaleigh Ling DO  [address] [address] [address] [phone]    Schedule an appointment as soon as possible for a visit      Appointments for Next 30 Days 9/12/2024 - 10/12/2024      None            Vital signs:  Temp:  [97.2 °F (36.2 °C)-98.7 °F (37.1 °C)] 97.3 °F (36.3 °C)  Pulse:  [58-80] 67  Resp:  [12-21] 18  BP: ()/(51-85) 111/64  SpO2:  [97 %-100 %] 98 %    Physical Exam:    General: No acute distress   Lungs: clear to auscultation  Cardiovascular: S1, S2  Abdomen:  Soft      -----------------------------------------------------------------------------------------------  PATIENT DISCHARGE INSTRUCTIONS: See electronic chart    Kevan Ledezma MD    Total time spent on discharge plannin minutes     The  Century Cures Act makes medical notes like these available to patients in the interest of transparency. Please be advised this is a medical document. Medical documents are intended to carry relevant information, facts as evident, and the clinical opinion of the practitioner. The medical note is intended as peer to peer communication and may appear blunt or direct. It is written in medical language and may contain abbreviations or verbiage that are unfamiliar.

## 2024-09-12 NOTE — IMAGING NOTE
Floor RN Chelsea called and instructed on gated study scheduled for this patient.  HR currently 58-63bpm without meds ordered.  Plan to bring down now as the outpatient slot  0800 is open. Okay eat a light breakfast/lunch, hydrate, hold caffeine/decaff/chocolate x 12 hours prior, hold long acting nitrates, take all meds especially beta blockers prescribed.  RN encouraged to call with further questions.  CT updated, plan to send for patient now.   Floor RN advised patient can have AM meds and to hydrate this AM, no caffeine

## 2024-09-12 NOTE — PLAN OF CARE
Received patient from ED into room 8610 around 2100.    Patient alert and orientated x 4. Recent tongue biopsy, states he feels a lump while swallowing at times. Passed dysphagia screening. Maintaining oxygen saturations on room air. Endorses dyspnea with rest and exertion. Lung sounds: clear. Skin intact. Normal sinus/larisa on tele monitor. Denies pain, describes it as \"discomfort\" to bilateral sides of abdomen that radiates to the back. BUE/BLE discomfort described as tightness and shooting pain. Continent of bowel and bladder. Up independently. Patient updated with plan of care. Bed locked in lowest position. Call light within reach.    Plan: cardiology consult, echo, gated CTA    Problem: Patient/Family Goals  Goal: Patient/Family Long Term Goal  Description: Patient's Long Term Goal: Stay out of hospital  Interventions:  - Attend all follow-up appointments  - Adhere to medication regimen  - See additional Care Plan goals for specific interventions  Outcome: Progressing  Goal: Patient/Family Short Term Goal  Description: Patient's Short Term Goal: Go home  Interventions:   - Cardiology consult  - Echocardiogram  - Gated CTA  - See additional Care Plan goals for specific interventions  Outcome: Progressing

## 2024-09-12 NOTE — HISTORICAL OFFICE NOTE
[facility]  [address] [place] [address]  [phone]      Dwight Cardenas  Progress Note  Demographics:  Name: Dwight Cardenas YOB: 1985  Age: 39, Male Medical Record No: [id number]  Visited Date/Time: 09/11/2024 11:40 AM    Chief Complaints  patient is having some chest pain for a few months   last night had palpitations, SOB,  History of Present Illness  39-year-old male presents to the office.  Patient history of a myocardial bridge, family history of coronary disease with bypass presents to the office with complaints of chest pain.  Patient was seen by his primary care physician yesterday which noted concerning EKG changes.  Patient having significant T wave inversions in his Anteroseptal leads that were more prominent yesterday than today.  States that he is woke up in the middle of the night with a chest discomfort he is never felt before.  Patient presents today with concerns.  He wants to go to the hospital to be further evaluated.  He is complaining of shortness of breath on exertion.  Denies lower extremity swelling.  No PND orthopnea.  Cardiac risk factors Former smoker  Past Medical History  1.Edema, Generalized  2.Numbness and tingling in right hand  3.Numbness and tingling of left upper extremity  4.Left-sided chest pain  5.Pressure in chest  6.SHERIFF (dyspnea on exertion)  7.Hx of arthroscopy of shoulder  8.Coronary-myocardial bridge  9.Pulmonary nodule, right  10.Current tobacco use  11.Palpitations  12.SOB (shortness of breath)  Past Surgical History  1.Hx of arthroscopy of shoulder  2.Coronary-myocardial bridge  Family History  1. Father - Hypertension (HTN), primary; S/P CABG x 4; Serum lipids high; DM (diabetes mellitus) Type 2  Social History  Smoking status Former smoker  Tobacco usage - No (Non-smoker for personal reasons (finding))  Review of systems  Cardiovascular Chest pain and Palpitations  No history of SHERIFF, Syncope, PND, Orthopnea,  Edema and Claudication  Physical Examination  Vitals Right Arm Sitting  / 60 mmHg, Pulse rate 86 bpm, Height in 5' 11\", BMI: 26.6, Weight in 191 lbs (or) 87 kgs and BSA : 2.1 cc/m²  General Appearance No Acute Distress and Appropriate  Cardiovascular   EKG/Other abnormalities  General: AAO x 3, no distress  EOMI: PERRLA, no conjunctiva  Resp: CTAB, no wheezing, coarse breath sounds  Cardiac: S1, S2, RRR, no M/R/G, no edema  GI: normal BS, soft, nontender  Ext: no edema, 2+ peripheral pulses  Neuro: no focal deficits  Allergies  No medication allergies noted.  Medications  1.magnesium gluconate 27.5 mg magnesium (500 mg) tablet, Take one-half tablet orally once a day.  2.metoprolol succinate ER 25 mg tablet,extended release 24 hr, Take 1 tablet orally 2 times a day.  3.omeprazole 20 mg tablet,delayed release, Take 1 tablet orally 2 times a day.  Impression  1.Pressure in chest  2.SHERIFF (dyspnea on exertion)  3.Coronary-myocardial bridge  4.Current tobacco use  5.Palpitations  Assessment & Plan  Chest pain, concerning for angina, possibly cardiac related  Dynamic EKG changes  History of myocardial bridge in his LAD    Recommendations  Twelve-lead EKG shows T wave inversions in the anteroseptal region.  These have been present since June 2024 however little more dynamic over the last 2 days.  Patient is having persistent symptoms, last night woke him up from sleep concerning for unstable angina.  Recommended the patient get an echocardiogram and possibly a CTA of his coronaries however patient is extremely concerned about the symptoms and is requesting to go to the hospital.  Patient states he will go to [facility] for further evaluation.  Recommend an ischemic workup and an echocardiogram once the patient arrives.  Labs and Diagnostics ordered  1.EKG (electrocardiogram) (Today)  Nurses documentation  Refill: none   Upcoming surgeries: none   Use of assistive devices(s): none   Triage & medication list  reviewed by:   SHEYLA  Patient instructions  Recommended the patient get an echocardiogram and possibly a CTA of his coronaries however patient is extremely concerned about the symptoms and is requesting to go to the hospital.  Patient states he will go to [facility] for further evaluation.  Recommend an ischemic workup and an echocardiogram once the patient arrives.  Diagnostics Details  Nuclear PET 06/13/2023  1.Stress EKG is normal.    1.This is a normal perfusion study, no perfusion defects noted.    2.The left ventricular cavity is noted to be normal on the stress studies. The stress left ventricular ejection fraction was calculated to be 72% and left ventricular global function is normal. The rest left ventricular cavity is noted to be normal. The rest left ventricular ejection fraction was calculated to be 64% and rest left ventricular global function is normal.    Trans Thoracic Echocardiogram 08/15/2022  1.The study quality is good.    2.The left ventricle is normal in size, wall thickness, and global left ventricular systolic function. The left ventricular ejection fraction is 55%. Left ventricular diastolic function is normal. No regional wall motion abnormality is noted.    ACTMonitoring 08/05/2022  1.This is a good quality study.    2.Symptom diary was submitted by the patient. Symptom: Palpitations correlated with sinus rhythm and sinus tachycardia. Symptoms: Shortness of Breath, Increase in Coughing and \"Other\" correlated with sinus rhythm.    3.Predominant rhythm is normal sinus rhythm.    4.The minimum heart rate recorded was 50 beats / minute (sinus bradycardia, 8/9/2022). The maximum heart rate is 163 beats / minute (sinus tachycardia, 8/10/2022). The mean heart rate is 83 beats / minute.    5.The atrial fibrillation burden is 0%.    CPOE Orders carried out by: Brenna Boyd  Care Providers: Jackie Bautista Abby Sikorcin PA and Cristofer Rodriguez MD  Electronically Authenticated by  Cristofer Rodriguez  MD  09/11/2024 04:40:36 PM  Disclaimer: Components of this note were documented using voice recognition system and are subject to errors not corrected at proofreading. Contact the author of this note for any clarifications.

## 2024-09-12 NOTE — PROGRESS NOTES
09/11/24 2111 09/11/24 2125 09/11/24 2127   Vital Signs   /69 108/71 119/75   MAP (mmHg) 82 83 85   BP Location Left arm Left arm Left arm   BP Method Automatic Automatic Automatic   Patient Position Lying Sitting Standing     Admission orthostatic BP's.

## 2024-09-12 NOTE — ED QUICK NOTES
Orders for admission, patient is aware of plan and ready to go upstairs. Any questions, please call ED RN Jamilah at extension [phone].     Patient Covid vaccination status: Fully vaccinated     COVID Test Ordered in ED: None    COVID Suspicion at Admission: N/A    Running Infusions:  None    Mental Status/LOC at time of transport: A&Ox4    Other pertinent information:   CIWA score: N/A   NIH score:  N/A        
Report given to Halley MCKEON  
2sw

## 2024-09-12 NOTE — H&P
[facility]     Dwight Cardenas Jr. Patient Status:  Emergency    1985 MRN [id number]   [facility] EMERGENCY DEPARTMENT Attending Olga Stringer MD   Hosp Day # 0 PCP Kaleigh Ling DO     Chief Complaint: Palpitations    Subjective:    History of Present Illness:     Dwight Cardenas Jr. is a 39 year old male with past medical history significant for myocardial bridge presents to the ER with c/o palpitations.  Pt has a mass at the base of his tongue which was biopsied earlier this year per ENT.  Results were c/w lingual tonsil hypertrophy and was started on Omeprazole twice daily.  He then had a PET scan at his request which showed increased uptake in the region of left base of tongue.  He was told this could be due to inflammation from recent biopsy but ultimately pt was referred to head and neck surgeon Dr. Ruff at [facility] for repeat laryngoscopy with biopsy.  Prior to this procedure, cardiac clearance was requested and pt went to see cardiology today.  Cardiologist advised pt to obtain 2D echo along with gated CTA for preop clearnace.  Pt states he has also been feeling his heart stop, particularly while he is sleeping.  He states this has occurred moreso over the past 2-3 nights and was very bad yesterday night and awoke him from sleep.  He also has multiple other complaints including fatigue, lethargy, abdominal pain, scrotal pressure, lower and upper extremity weakness.  It appears he has had extensive endocrine and rheumatological work up without a clear diagnosis.  He also c/o worsening reflux symptoms and states he had black diarrhea once and would like this checked out as well with EGD/colonoscopy.    History/Other:    Past Medical History:  Past Medical History:    Anesthesia complication    Extreme hiccups    Anxiety    Esophageal reflux    Mitral valve prolapse    Myocardial bridge (HCC)    OTHER DISEASES    right pulmonary nodule - was  found to be benign     OTHER DISEASES    HX childhood pneumonia    Seizure disorder (HCC)    In childhood a couple seizures; around 9 years of age undetermined cause     Past Surgical History:   Past Surgical History:   Procedure Laterality Date    Angiogram      2017 - found to have myocardial bridge     Other surgical history  June 2010 [facility]    lung BX     Shoulder arthroscopy        Family History:   Family History   Problem Relation Age of Onset    Other (lung cancer) Mother     Cancer Mother         Lung cancer    Diabetes Father     Other (Quadruple bypass) Father     Colon Cancer Maternal Grandfather     Other (quad bypass) Paternal Grandfather      Social History:    reports that he has quit smoking. His smoking use included cigarettes. He has a 5 pack-year smoking history. He has never used smokeless tobacco. He reports that he does not currently use alcohol. He reports that he does not use drugs.     Allergies:   Allergies   Allergen Reactions    No Known Allergies        Medications:    Current Facility-Administered Medications on File Prior to Encounter   Medication Dose Route Frequency Provider Last Rate Last Admin    [COMPLETED] acetaminophen (Tylenol Extra Strength) tab 1,000 mg  1,000 mg Oral Once PRN Tree King MD        Or    [COMPLETED] HYDROcodone-acetaminophen (Norco) 5-325 MG per tab 1 tablet  1 tablet Oral Once PRN Tree King MD   1 tablet at 06/20/24 1544    Or    [COMPLETED] HYDROcodone-acetaminophen (Norco) 5-325 MG per tab 2 tablet  2 tablet Oral Once PRN Tree King MD        [COMPLETED] HYDROmorphone (Dilaudid) 1 MG/ML injection              Current Outpatient Medications on File Prior to Encounter   Medication Sig Dispense Refill    simvastatin 20 MG Oral Tab Take 1 tablet (20 mg total) by mouth nightly.      dicyclomine 10 MG Oral Cap Take 1 capsule (10 mg total) by mouth 4 (four) times daily. 120 capsule 0    fluticasone propionate 50 MCG/ACT  Nasal Suspension 2 sprays to each nostril once daily after shower. 3 each 0    Omeprazole 40 MG Oral Capsule Delayed Release Take 1 capsule (40 mg total) by mouth in the morning and 1 capsule (40 mg total) before bedtime. Before a meal. 120 capsule 2    [] HYDROcodone-acetaminophen 7.5-325 MG/15ML Oral Solution Take 15 mL by mouth every 4 (four) hours as needed for Pain. 473 mL 0    metoprolol tartrate 25 MG Oral Tab Take 1 tablet (25 mg total) by mouth daily.         Review of Systems:   A comprehensive review of systems was completed.    Pertinent positives and negatives noted in the HPI.    Objective:   Physical Exam:    /75   Pulse 78   Temp 97.2 °F (36.2 °C) (Temporal)   Resp 16   Ht 5' 11\" (1.803 m)   Wt 190 lb (86.2 kg)   SpO2 99%   BMI 26.50 kg/m²   General: No acute distress, Alert  Respiratory: No rhonchi, no wheezes  Cardiovascular: S1, S2. Regular rate and rhythm  Abdomen: Soft, Non-tender, non-distended, positive bowel sounds  Neuro: No new focal deficits  Extremities: No edema      Results:    Labs:      Labs Last 24 Hours:    Recent Labs   Lab 24  1519   RBC 4.46   HGB 13.7   HCT 40.2   MCV 90.1   MCH 30.7   MCHC 34.1   RDW 12.5   NEPRELIM 4.09   WBC 7.9   .0       Recent Labs   Lab 24  1519   GLU 86   BUN 12   CREATSERUM 0.96   EGFRCR 102   CA 9.4   ALB 4.5      K 4.3      CO2 27.0   ALKPHO 59   AST 11   ALT 9*   BILT 0.3   TP 7.4       No results found for: \"PT\", \"INR\"    Recent Labs   Lab 24  1519   TROPHS <3       No results for input(s): \"TROP\", \"PBNP\" in the last 168 hours.    No results for input(s): \"PCT\" in the last 168 hours.    Imaging: Imaging data reviewed in Epic.    Assessment & Plan:      #Palpitations, ?pauses, h/o myocardial bridge  Initial ischemic w/u negative  2D echo pending  Gated CT pending  Cont home BB  Cardiology to eval    #Mass at base of tongue  Scheduled for biopsy with Dr. Jackie Louise once cleared    #GERD with  progressive worsening symptoms  Consider GI eval for EGD  Already on PPi BID    #DL, statin      Plan of care discussed with pt and wife,    Maame Burgos MD    Supplementary Documentation:     The 21st Century Cures Act makes medical notes like these available to patients in the interest of transparency. Please be advised this is a medical document. Medical documents are intended to carry relevant information, facts as evident, and the clinical opinion of the practitioner. The medical note is intended as peer to peer communication and may appear blunt or direct. It is written in medical language and may contain abbreviations or verbiage that are unfamiliar.

## 2024-09-12 NOTE — PROGRESS NOTES
Progress Note  Dwight Cardenas Jr. Patient Status:  Observation    1985 MRN [id number]   Location [facility] 8NE-A Attending Kevan Ledezma MD   Hosp Day # 0 PCP Kaleigh Ling DO     Subjective:  He is currently chest pain free but has intermittent chest tightness. Over the past several months he has had vague fatigue, intermittent confusion, fevers- seeing immunologist and neuroimmunologist next month.     Objective:  /64 (BP Location: Left arm)   Pulse 67   Temp 97.3 °F (36.3 °C) (Oral)   Resp 18   Ht 5' 11\" (1.803 m)   Wt 188 lb 4.4 oz (85.4 kg)   SpO2 98%   BMI 26.26 kg/m²     Intake/Output:    Intake/Output Summary (Last 24 hours) at 2024 1309  Last data filed at 2024 0900  Gross per 24 hour   Intake 720 ml   Output --   Net 720 ml       Last 3 Weights   24 2221 188 lb 4.4 oz (85.4 kg)   24 2111 188 lb 4.4 oz (85.4 kg)   24 1510 190 lb (86.2 kg)   09/10/24 0917 191 lb (86.6 kg)   24 1320 191 lb (86.6 kg)       Labs:  Recent Labs   Lab 24  1519   GLU 86   BUN 12   CREATSERUM 0.96   EGFRCR 102   CA 9.4      K 4.3      CO2 27.0     Recent Labs   Lab 24  1519   RBC 4.46   HGB 13.7   HCT 40.2   MCV 90.1   MCH 30.7   MCHC 34.1   RDW 12.5   NEPRELIM 4.09   WBC 7.9   .0         Recent Labs   Lab 24  1519   TROPHS <3       Diagnostics:   Telemetry: NSR, sinus bradycardia    Echocardiogram 24  1. Left ventricle: The cavity size was normal. Wall thickness was normal.      Systolic function was normal. The estimated ejection fraction was 55-60%,      by 3D assessment. No diagnostic evidence for regional wall motion      abnormalities. Left ventricular diastolic function parameters were normal      for the patient's age.   2. Right ventricle: Systolic function was normal.   3. Left atrium: The left atrial volume was normal.   4. Mitral valve: Systolic bowing without prolapse. There was trivial      regurgitation.   5.  Pulmonary arteries: Systolic pressure was within the normal range,      estimated to be 19mm Hg.   Impressions:  No previous study from [facility] was   available for comparison.     CCTA 9/12/24  IMPRESSION:  Normal coronary arteries with no significant plaque/stenosis visualized in the major epicardial coronary arteries.  Specifically there is no evidence of intramyocardial bridging of any of the major epicardial coronary arteries.  Normal aorta and pericardium within limitations of CT technique.  Reviewed imaging findings at time of reading with primary cardiologist, Dr. Cristofer Rodriguez.  Non-cardiac read  CONCLUSION:    1. No acute pulmonary findings.    2. Stable 3 mm left upper lobe nodule.       MRI brain 1/2024  1. No evidence of acute intracranial ischemia, hemorrhage, mass effect, abnormal enhancement or   epileptogenic focus.    2. No suspicious white matter lesions identified.       Review of Systems   Cardiovascular:  Positive for chest pain.   Respiratory:  Negative for shortness of breath.        Physical Exam:  General: Alert and oriented in no apparent distress.  HEENT: Pupils equal. Mucous membranes moist.   Neck: No JVD  Cardiac:  Normal S1 S2, Regular. No murmur  Lungs: Clear without wheezes or crackles    Abdomen: Soft, non-tender, ND  Extremities: Without clubbing, cyanosis or edema.    Neurologic: No focal deficits. Normal affect.  Skin: Warm and dry,    Medications:   metoprolol        nitroglycerin        dicyclomine  10 mg Oral QID    fluticasone propionate  1 spray Each Nare Daily    pantoprazole  40 mg Oral BID AC    atorvastatin  10 mg Oral Nightly    metoprolol succinate ER  25 mg Oral 2x Daily(Beta Blocker)         Assessment:    Chest pain, abnormal EKG  EKG with T wave inversions anteroseptal leads which are more prominent than previous EKG but were present on prior 7/2024  Troponin negative  Echocardiogram reassuring with preserved LVEF 55-60%, no resting WMA  CCTA with  normal epicardial coronary arteries without significant plaque/stenosis. Specifically there is no evidence of intramyocardial bridging. Patient had previous reported myocardial bridge from cath 2015 at outside hospital  Palpitations - plan for 7 day MCT monitor  Fever, fatigue, intermittent confusion - seeing immunologist and neuroimmunologist next month. Elevated CRP  GERD - continue PPI  Hyperlipidemia - statin  History of tobacco use    Plan:    CCTA and echocardiogram are reassuring. Troponin negative. Normal epicardial coronary arteries and no intramyocardial bridging.   EKG is abnormal with T wave inversion anteroseptal leads. No significant LVH or apical HCM on echocardiogram. Will order outpatient cMRI and [facility] [employer]. He had recent brain MRI without intracranial findings that would suggest cerebral TWI; however he does have plans to see neuroimmunologist next month and elevated CRP here.   Possibly underlying vasospasm. BB has not provided him relief. Discontinue metoprolol-XL 25 mg daily and start trial of diltiazem 120 mg daily  Outpatient 7 day MCT  Stable for discharge from cardiology standpoint      Plan of care discussed with patient, RN, KESHAWN Sharma  9/12/2024  1:09 PM      =======================================================  Patient seen and examined independently.  Note reviewed and labs reviewed.  Agree with above assessment and plan.    Physical exam:  GEN: Alert and orient, no acute distress  CV: rrr, S1S2, no m/g/r  LUNGS: CTA b/l with no obvious wheezing, rales or rhonchi  EXT: no b/l LE edema  ABD: soft, NTND    I have personally performed the medical decision making in its entirety. My additions include: None.    D/w BERNARDO Wood, Dr. Cristofer Rodriguez and patient.    Trevor Carrero MD

## 2024-09-12 NOTE — CONSULTS
Sent in from [facility] office     Facility [facility]  [address] [place] [address]  [phone]        Dwight Cardenas  Progress Note  Demographics:  Name:Anjel Cardenas Date:02/02/1985  Age:39, MaleMedical Record No:[id number]  Visited Date/Time:          09/11/2024 11:40 AM                    Chief Complaints  patient is having some chest pain for a few months   last night had palpitations, SOB,  History of Present Illness  39-year-old male presents to the office.  Patient history of a myocardial bridge, family history of coronary disease with bypass presents to the office with complaints of chest pain.  Patient was seen by his primary care physician yesterday which noted concerning EKG changes.  Patient having significant T wave inversions in his Anteroseptal leads that were more prominent yesterday than today.  States that he is woke up in the middle of the night with a chest discomfort he is never felt before.  Patient presents today with concerns.  He wants to go to the hospital to be further evaluated.  He is complaining of shortness of breath on exertion.  Denies lower extremity swelling.  No PND orthopnea.  Cardiac risk factorsFormer smoker  Past Medical History  1.Edema, Generalized  2.Numbness and tingling in right hand  3.Numbness and tingling of left upper extremity  4.Left-sided chest pain  5.Pressure in chest  6.SHERIFF (dyspnea on exertion)  7.Hx of arthroscopy of shoulder  8.Coronary-myocardial bridge  9.Pulmonary nodule, right  10.Current tobacco use  11.Palpitations  12.SOB (shortness of breath)  Past Surgical History  1.Hx of arthroscopy of shoulder  2.Coronary-myocardial bridge  Family History  1. Father - Hypertension (HTN), primary; S/P CABG x 4; Serum lipids high; DM (diabetes mellitus) Type 2  Social History  Smoking statusFormer smoker  Tobacco usage - No (Non-smoker for personal reasons (finding))  Review of systems  CardiovascularChest pain and  Palpitations  No history of SHERIFF, Syncope, PND, Orthopnea, Edema and Claudication  Physical Examination  VitalsRight Arm Sitting  / 60 mmHg, Pulse rate 86 bpm, Height in 5' 11\", BMI: 26.6, Weight in 191 lbs (or) 87 kgs and BSA : 2.1 cc/m²  General AppearanceNo Acute Distress and Appropriate  Cardiovascular    EKG/Other abnormalities  General: AAO x 3, no distress  EOMI: PERRLA, no conjunctiva  Resp: CTAB, no wheezing, coarse breath sounds  Cardiac: S1, S2, RRR, no M/R/G, no edema  GI: normal BS, soft, nontender  Ext: no edema, 2+ peripheral pulses  Neuro: no focal deficits  Allergies  No medication allergies noted.  Medications  1.magnesium gluconate 27.5 mg magnesium (500 mg) tablet, Take one-half tablet orally once a day.  2.metoprolol succinate ER 25 mg tablet,extended release 24 hr, Take 1 tablet orally 2 times a day.  3.omeprazole 20 mg tablet,delayed release, Take 1 tablet orally 2 times a day.  Impression  1.Pressure in chest  2.SHERIFF (dyspnea on exertion)  3.Coronary-myocardial bridge  4.Current tobacco use  5.Palpitations  Assessment & Plan  Chest pain, concerning for angina, possibly cardiac related  Dynamic EKG changes  History of myocardial bridge in his LAD     Recommendations  Twelve-lead EKG shows T wave inversions in the anteroseptal region.  These have been present since June 2024 however little more dynamic over the last 2 days.  Patient is having persistent symptoms, last night woke him up from sleep concerning for unstable angina.  Recommended the patient get an echocardiogram and possibly a CTA of his coronaries however patient is extremely concerned about the symptoms and is requesting to go to the hospital.  Patient states he will go to [facility] for further evaluation.  Recommend an ischemic workup and an echocardiogram once the patient arrives.  Labs and Diagnostics ordered  1.EKG (electrocardiogram) (Today)  Nurses documentation  Refill: none   Upcoming surgeries: none   Use of  assistive devices(s): none   Triage & medication list reviewed by:   SHEYLA  Patient instructions  Recommended the patient get an echocardiogram and possibly a CTA of his coronaries however patient is extremely concerned about the symptoms and is requesting to go to the hospital.  Patient states he will go to [facility] for further evaluation.  Recommend an ischemic workup and an echocardiogram once the patient arrives.  Diagnostics Details  Nuclear PET 06/13/2023  1.Stress EKG is normal.     1.This is a normal perfusion study, no perfusion defects noted.     2.The left ventricular cavity is noted to be normal on the stress studies. The stress left ventricular ejection fraction was calculated to be 72% and left ventricular global function is normal. The rest left ventricular cavity is noted to be normal. The rest left ventricular ejection fraction was calculated to be 64% and rest left ventricular global function is normal.     Trans Thoracic Echocardiogram 08/15/2022  1.The study quality is good.     2.The left ventricle is normal in size, wall thickness, and global left ventricular systolic function. The left ventricular ejection fraction is 55%. Left ventricular diastolic function is normal. No regional wall motion abnormality is noted.     ACTMonitoring 08/05/2022  1.This is a good quality study.     2.Symptom diary was submitted by the patient. Symptom: Palpitations correlated with sinus rhythm and sinus tachycardia. Symptoms: Shortness of Breath, Increase in Coughing and \"Other\" correlated with sinus rhythm.     3.Predominant rhythm is normal sinus rhythm.     4.The minimum heart rate recorded was 50 beats / minute (sinus bradycardia, 8/9/2022). The maximum heart rate is 163 beats / minute (sinus tachycardia, 8/10/2022). The mean heart rate is 83 beats / minute.     5.The atrial fibrillation burden is 0%.     CPOE Orders carried out by: Brenna Boyd  Care Providers: Jackie Bautista Abby Sikorcin  PA and Cristofer Rodriguez MD  Electronically Authenticated by  Cristofer Rodriguez MD  09/11/2024 04:40:36 PM  Disclaimer: Components of this note were documented using voice recognition system and are subject to errors not corrected at proofreading. Contact the author of this note for any clarifications.

## 2024-09-16 ENCOUNTER — PATIENT MESSAGE (OUTPATIENT)
Dept: FAMILY MEDICINE CLINIC | Facility: CLINIC | Age: 39
End: 2024-09-16

## 2024-09-16 ENCOUNTER — TELEPHONE (OUTPATIENT)
Dept: FAMILY MEDICINE CLINIC | Facility: CLINIC | Age: 39
End: 2024-09-16

## 2024-09-16 ENCOUNTER — PATIENT OUTREACH (OUTPATIENT)
Dept: CASE MANAGEMENT | Age: 39
End: 2024-09-16

## 2024-09-16 DIAGNOSIS — R79.82 ELEVATED C-REACTIVE PROTEIN (CRP): Primary | ICD-10-CM

## 2024-09-16 DIAGNOSIS — R00.2 PALPITATIONS: Primary | ICD-10-CM

## 2024-09-16 DIAGNOSIS — Z02.9 ENCOUNTERS FOR ADMINISTRATIVE PURPOSES: ICD-10-CM

## 2024-09-16 NOTE — TELEPHONE ENCOUNTER
From: Dwight Cardenas Jr.  To: Karmen Ellison  Sent: 9/16/2024 1:30 PM CDT  Subject: ?    Hi doc the other dr sending me for another cr protein test seeing if you can add on Candida Toxoplasma gondii h poly Histoplasmosis n hiv this is stuff that can cause the lesion on my pharynx now theres a new spot on my upper lobe on my lung the squeezing on the insides are intense anion gap was slightly low as well my mouth feels funny my pelvic area hurts again with lower back pain..Its not gonna hurt to run these test and other ones whatever this is starting to effect my heart i would like to run more test before i go to [place] to see a dr the 27th bc if she cant figure it out we might need to do a spinal tap n with the bad ekg twice in a couple n new findings aint gonna hurt to run more

## 2024-09-16 NOTE — TELEPHONE ENCOUNTER
Spoke with patient. Informed patient that lab was ordered and to complete in 2 wks. Patient agreeable. Patient has not been cleared for surgery as of yet.

## 2024-09-16 NOTE — TELEPHONE ENCOUNTER
Spoke to patient for TCM today.  Patient states that his CRP was elevated and he had abnormal results regarding anion gap and is asking if PCP wants any further testing regarding this? Please discuss with PCP and follow up with patient. He also states that he will schedule an appt with PCP after he has his MRI done, which he has not scheduled yet.  TCM appointment recommended by 9/26/24 as patient is a Moderate risk for readmission.      BOOK BY DATE (last date for TCM): 9/26/24

## 2024-09-16 NOTE — TELEPHONE ENCOUNTER
Patient was in admitted at [facility] 9/[facility]. Patient had elevated CRP and abnormal anion gap. Patient is wanting to know if DR Montez would like any follow up tests?  Please review and advise

## 2024-09-16 NOTE — TELEPHONE ENCOUNTER
We can recheck his CRP in 2 weeks. This is a marker for inflammation. The one he had done in June was normal. His anion gap is normal. I will place the order for his repeat CRP. Has cardiology cleared him for his surgery?

## 2024-09-16 NOTE — PROGRESS NOTES
Transitional Care Management   Discharge Date: 24  Contact Date: 2024    Assessment:  TCM Initial Assessment    General:  Assessment completed with: Patient  Patient Subjective: They want me to get an MRI done first. Just feeling fatigued but that's normal since I've been sick.  Chief Complaint: palpitations  Verify patient name and  with patient/ caregiver: Yes    Hospital Stay/Discharge:  Tell me what you understand of why you were in the hospital or emergency department: palpitations  Prior to leaving the hospital were your Discharge Instructions reviewed with you?: Yes  Did you receive a copy of your written Discharge Instructions?: Yes  What questions do you have about your Discharge Instructions?: none  Do you feel better or worse since you left the hospital or emergency department?: Same    Follow - Up Appointment:  Do you have a follow-up appointment?: No  Are there any barriers to getting to your follow-up appointment?: No    Home Health/DME:  Prior to leaving the hospital was Home Health (HH) arranged for you?: No     Prior to leaving the hospital or emergency department was Durable Medical Equipment (DME), medical supplies, or infusions arranged for you?: No  Are DME/medical supply/infusions needs identified by staff during this assessment?: No     Medications/Diet:  Did any of your medications change, during or after your hospital stay or ED visit?: Yes  Do you have your new or updated medications?: Yes  Do you understand what your medications are for and possible side effects?: Yes  Are there any reasons that keep you from taking your medication as prescribed?: No  Any concerns about medication refills?: No    Were you given a different diet per your Discharge Instructions?: No     Questions/Concerns:  Do you have any questions or concerns that have not been discussed?: No       Nursing Interventions: NCM reviewed discharge instructions and when to seek medical attention with the patient.  He states that he is just feeling fatigued but that's normal for him since he became sick. He states that he has off and on palpitations which is unchanged since discharge. He states that he will be getting the heart monitor on tomorrow and will also schedule the MRI. He does state that he had an Elevated CRP and decreased anion gap and is asking if PCP wants any further testing done regarding that. NCM sent TE to PCP office and pt aware that he will receive a return call. He does not check his bp. He states that he still has the abdominal tightness and gets nausea here and there, which he states that PCP is aware of and is not a new issue. He states that he sees a GI for this. He denied having any fever, vomiting, c/d, HA  or any new or worsening symptoms. Med review completed. He denied having any other questions or concerns at this time.     Medication Review:   Current Outpatient Medications   Medication Sig Dispense Refill    dilTIAZem  MG Oral Capsule SR 24 Hr Take 1 capsule (120 mg total) by mouth daily. 30 capsule 1    atorvastatin 10 MG Oral Tab Take 1 tablet (10 mg total) by mouth nightly. 30 tablet 1    albuterol 108 (90 Base) MCG/ACT Inhalation Aero Soln Inhale 2 puffs into the lungs every 4 (four) hours as needed for Wheezing.      dicyclomine 10 MG Oral Cap Take 1 capsule (10 mg total) by mouth 4 (four) times daily. (Patient taking differently: Take 1 capsule (10 mg total) by mouth 4 (four) times daily. Patient hasn't started medication yet. States he doesn't mind if med is ordered for hospital stay.) 120 capsule 0    fluticasone propionate 50 MCG/ACT Nasal Suspension 2 sprays to each nostril once daily after shower. (Patient taking differently: 2 sprays by Each Nare route daily as needed for Allergies.) 3 each 0    Omeprazole 40 MG Oral Capsule Delayed Release Take 1 capsule (40 mg total) by mouth in the morning and 1 capsule (40 mg total) before bedtime. Before a meal. 120 capsule 2     Did  patient review medications using current pill bottles and not just a medication list?  No  Discharge medications reviewed/discussed/and reconciled against outpatient medications with patient.  Any changes or updates to medications sent to primary care provider.  Patient Acknowledged    SDOH:   Transportation Needs: No Transportation Needs (9/11/2024)    Transportation Needs     Lack of Transportation: No     Car Seat: Not on file     Financial Resource Strain: Low Risk  (9/16/2024)    Financial Resource Strain     Difficulty of Paying Living Expenses: Not hard at all     Med Affordability: No           Follow-up Appointments:      Transitional Care Clinic  Was TCC Ordered: No      Primary Care Provider (If no TCC appointment)  Does patient already have a PCP appointment scheduled? No  Nurse Care Manager Attempted to schedule PCP office TCM appointment with patient   -If no appointment scheduled: Explain -pt states that he will schedule with PCP after he gets an MRI, which he states he will schedule to have done at [facility].    Specialist  Does the patient have any other follow-up appointment(s) that need to be scheduled? Yes   -If yes: Nurse Care Manager reviewed upcoming specialist appointments with patient: Yes   -Does the patient need assistance scheduling appointment(s): No, pt states that he is getting the heart monitor on tomorrow and will schedule with cardiology.     CCM referral placed:  No    Book By Date: 9/26/24

## 2024-09-18 ENCOUNTER — LAB ENCOUNTER (OUTPATIENT)
Dept: LAB | Age: 39
End: 2024-09-18
Attending: FAMILY MEDICINE
Payer: COMMERCIAL

## 2024-09-18 DIAGNOSIS — Z11.1 SCREENING-PULMONARY TB: ICD-10-CM

## 2024-09-18 DIAGNOSIS — R79.82 ELEVATED C-REACTIVE PROTEIN (CRP): ICD-10-CM

## 2024-09-18 LAB — CRP SERPL-MCNC: <0.4 MG/DL (ref ?–0.5)

## 2024-09-18 PROCEDURE — 86140 C-REACTIVE PROTEIN: CPT | Performed by: FAMILY MEDICINE

## 2024-09-18 PROCEDURE — 86480 TB TEST CELL IMMUN MEASURE: CPT | Performed by: FAMILY MEDICINE

## 2024-09-19 DIAGNOSIS — Q24.5 CORONARY-MYOCARDIAL BRIDGE (CMD): Primary | ICD-10-CM

## 2024-09-19 DIAGNOSIS — R00.2 PALPITATIONS: ICD-10-CM

## 2024-09-19 DIAGNOSIS — R07.9 LEFT-SIDED CHEST PAIN: ICD-10-CM

## 2024-09-20 LAB
M TB IFN-G CD4+ T-CELLS BLD-ACNC: 0 IU/ML
M TB TUBERC IFN-G BLD QL: NEGATIVE
M TB TUBERC IGNF/MITOGEN IGNF CONTROL: 5.82 IU/ML
QFT TB1 AG MINUS NIL: 0 IU/ML
QFT TB2 AG MINUS NIL: 0.01 IU/ML

## 2024-10-22 ENCOUNTER — PATIENT MESSAGE (OUTPATIENT)
Dept: FAMILY MEDICINE CLINIC | Facility: CLINIC | Age: 39
End: 2024-10-22

## 2024-10-29 ENCOUNTER — APPOINTMENT (OUTPATIENT)
Dept: MRI IMAGING | Age: 39
End: 2024-10-29
Attending: ORTHOPAEDIC SURGERY

## 2024-11-27 ENCOUNTER — PATIENT MESSAGE (OUTPATIENT)
Dept: FAMILY MEDICINE CLINIC | Facility: CLINIC | Age: 39
End: 2024-11-27

## 2024-11-27 DIAGNOSIS — R20.2 PARESTHESIA: Primary | ICD-10-CM

## 2024-11-27 RX ORDER — GABAPENTIN 100 MG/1
CAPSULE ORAL
Qty: 90 CAPSULE | Refills: 0 | Status: SHIPPED | OUTPATIENT
Start: 2024-11-27

## 2024-11-27 NOTE — TELEPHONE ENCOUNTER
Rx Gabapentin 100 mg one tablet daily for one week and then one tablet bid for one week and then increase to one tablet tid. This can cause some drowsiness initially. Rx sent to the patient's pharmacy.

## 2024-12-12 ENCOUNTER — APPOINTMENT (OUTPATIENT)
Dept: LAB | Facility: HOSPITAL | Age: 39
End: 2024-12-12
Attending: INTERNAL MEDICINE
Payer: COMMERCIAL

## 2024-12-12 ENCOUNTER — HOSPITAL ENCOUNTER (OUTPATIENT)
Dept: MRI IMAGING | Facility: HOSPITAL | Age: 39
Discharge: HOME | End: 2024-12-12
Attending: INTERNAL MEDICINE
Payer: COMMERCIAL

## 2024-12-21 ENCOUNTER — HOSPITAL ENCOUNTER (EMERGENCY)
Age: 39
Discharge: HOME | End: 2024-12-21
Payer: COMMERCIAL

## 2024-12-21 VITALS
TEMPERATURE: 98 F | HEART RATE: 99 BPM | WEIGHT: 180 LBS | HEIGHT: 71 IN | BODY MASS INDEX: 25.2 KG/M2 | OXYGEN SATURATION: 100 % | DIASTOLIC BLOOD PRESSURE: 71 MMHG | SYSTOLIC BLOOD PRESSURE: 115 MMHG | RESPIRATION RATE: 18 BRPM

## 2024-12-21 DIAGNOSIS — S05.02XA ABRASION OF LEFT CORNEA, INITIAL ENCOUNTER: Primary | ICD-10-CM

## 2024-12-21 PROCEDURE — 99284 EMERGENCY DEPT VISIT MOD MDM: CPT

## 2024-12-21 PROCEDURE — 99283 EMERGENCY DEPT VISIT LOW MDM: CPT

## 2024-12-21 RX ORDER — ACETAMINOPHEN AND CODEINE PHOSPHATE 300; 30 MG/1; MG/1
1-2 TABLET ORAL EVERY 6 HOURS PRN
Qty: 10 TABLET | Refills: 0 | Status: SHIPPED | OUTPATIENT
Start: 2024-12-21 | End: 2024-12-26

## 2024-12-21 RX ORDER — OFLOXACIN 3 MG/ML
2 SOLUTION/ DROPS OPHTHALMIC 4 TIMES DAILY
Qty: 10 ML | Refills: 0 | Status: SHIPPED | OUTPATIENT
Start: 2024-12-21 | End: 2024-12-28

## 2024-12-21 RX ORDER — TETRACAINE HYDROCHLORIDE 5 MG/ML
1 SOLUTION OPHTHALMIC ONCE
Status: COMPLETED | OUTPATIENT
Start: 2024-12-21 | End: 2024-12-21

## 2024-12-21 NOTE — ED PROVIDER NOTES
Patient Seen in: [facility] [place]      History     Chief Complaint   Patient presents with    Eye Pain     Stated Complaint: eye pain    Subjective:   39-year-old male presents to immediate care for left eye pain.  Patient states he was putting an ice pack on his face due to a dental issue when the ice pack was knowledged and the corner hit his eye.  He said pain and tearing since.          Objective:     Past Medical History:    Anesthesia complication    Extreme hiccups    Anxiety    Cancer (HCC)    Esophageal reflux    Mitral valve prolapse    Myocardial bridge (HCC)    OTHER DISEASES    right pulmonary nodule - was found to be benign     OTHER DISEASES    HX childhood pneumonia    Seizure disorder (HCC)    In childhood a couple seizures; around 9 years of age undetermined cause              Past Surgical History:   Procedure Laterality Date    Angiogram      2017 - found to have myocardial bridge     Other surgical history  June 2010 [facility]    lung BX     Shoulder arthroscopy                  Social History     Socioeconomic History    Marital status:    Tobacco Use    Smoking status: Former     Current packs/day: 0.50     Average packs/day: 0.5 packs/day for 10.0 years (5.0 ttl pk-yrs)     Types: Cigarettes    Smokeless tobacco: Never   Vaping Use    Vaping status: Never Used   Substance and Sexual Activity    Alcohol use: Not Currently    Drug use: Never   Other Topics Concern     Service No    Blood Transfusions No    Caffeine Concern No    Occupational Exposure No    Hobby Hazards No    Sleep Concern Yes    Stress Concern No    Weight Concern No    Special Diet No    Back Care No    Exercise Yes    Bike Helmet No    Seat Belt Yes    Self-Exams No     Social Drivers of Health     Financial Resource Strain: Low Risk  (9/16/2024)    Financial Resource Strain     Difficulty of Paying Living Expenses: Not hard at all     Med Affordability: No   Food Insecurity: No  Food Insecurity (9/11/2024)    Food Insecurity     Food Insecurity: Never true   Transportation Needs: No Transportation Needs (9/11/2024)    Transportation Needs     Lack of Transportation: No   Housing Stability: Low Risk  (9/11/2024)    Housing Stability     Housing Instability: No                  Physical Exam     ED Triage Vitals [12/21/24 1440]   /72   Pulse 109   Resp 18   Temp 97.9 °F (36.6 °C)   Temp src Temporal   SpO2 97 %   O2 Device None (Room air)       Current Vitals:   Vital Signs  BP: 115/71  Pulse: 99  Resp: 18  Temp: 97.9 °F (36.6 °C)  Temp src: Temporal    Oxygen Therapy  SpO2: 100 %  O2 Device: None (Room air)      Right Eye Chart Acuity: 20/25, Uncorrected  Left Eye Chart Acuity: 20/30, Uncorrected  Physical Exam  Vitals and nursing note reviewed.   Constitutional:       General: He is not in acute distress.  HENT:      Head: Normocephalic.   Eyes:        Comments: Topical anesthetic was instilled with good anesthesia using 1 drop of ophthalmic tetracaine.  Fluorescein stain of the left eye was performed, uptake noted at the 6 o'clock position overlying the iris   Cardiovascular:      Rate and Rhythm: Normal rate.   Pulmonary:      Effort: Pulmonary effort is normal.   Musculoskeletal:         General: Normal range of motion.   Skin:     General: Skin is warm and dry.   Neurological:      General: No focal deficit present.      Mental Status: He is alert and oriented to person, place, and time.         ED Course   Labs Reviewed - No data to display     MDM      Medical Decision Making  Pertinent Labs & Imaging studies reviewed. (See chart for details).  Patient coming in with left eye pain.   Differential diagnosis includes corneal abrasion, globe injury  Will discharge on Ocuflox.   Patient is comfortable with this plan.     Overall Pt looks good. Non-toxic, well-hydrated and in no respiratory distress. Vital signs are reassuring. Exam is reassuring. I do not believe pt requires and  additional diagnostic studies or intervention. I believe pt can be discharged home to continue evaluation as an outpatient. Follow-up provider given. Discharge instructions given and reviewed. Return for any problems. All understand and agrees with the plan.        Problems Addressed:  Abrasion of left cornea, initial encounter: acute illness or injury    Risk  OTC drugs.  Prescription drug management.        Disposition and Plan     Clinical Impression:  1. Abrasion of left cornea, initial encounter         Disposition:  Discharge  12/21/2024  3:47 pm    Follow-up:  Kaleigh Ling DO  [address]  [facility] [address] [phone]    Follow up            Medications Prescribed:  Discharge Medication List as of 12/21/2024  3:49 PM        START taking these medications    Details   ofloxacin 0.3 % Ophthalmic Solution Apply 2 drops to eye 4 (four) times daily for 7 days., Normal, Disp-10 mL, R-0      acetaminophen-codeine 300-30 MG Oral Tab Take 1-2 tablets by mouth every 6 (six) hours as needed for Pain., Normal, Disp-10 tablet, R-0                 Supplementary Documentation:

## 2025-01-24 ENCOUNTER — TELEPHONE (OUTPATIENT)
Dept: FAMILY MEDICINE CLINIC | Facility: CLINIC | Age: 40
End: 2025-01-24

## 2025-01-24 NOTE — TELEPHONE ENCOUNTER
Dwight Cardenas Jr. was scheduled for an appt on 1/24/25 with a visit reason of Pos op f/up .    Letter/MyChart message sent to pt notifying of missed appointment with $40 no show fee.

## 2025-01-28 ENCOUNTER — LAB ENCOUNTER (OUTPATIENT)
Dept: LAB | Age: 40
End: 2025-01-28
Attending: FAMILY MEDICINE
Payer: COMMERCIAL

## 2025-01-28 ENCOUNTER — PATIENT MESSAGE (OUTPATIENT)
Dept: FAMILY MEDICINE CLINIC | Facility: CLINIC | Age: 40
End: 2025-01-28

## 2025-01-28 DIAGNOSIS — R76.8 HIGH TOTAL SERUM IGG: ICD-10-CM

## 2025-01-28 DIAGNOSIS — E06.3 HASHIMOTO'S DISEASE: Primary | ICD-10-CM

## 2025-01-28 DIAGNOSIS — E06.3 HASHIMOTO'S DISEASE: ICD-10-CM

## 2025-01-28 LAB
T4 FREE SERPL-MCNC: 1.1 NG/DL (ref 0.8–1.7)
TSI SER-ACNC: 1.5 UIU/ML (ref 0.55–4.78)

## 2025-01-28 PROCEDURE — 84443 ASSAY THYROID STIM HORMONE: CPT | Performed by: FAMILY MEDICINE

## 2025-01-28 PROCEDURE — 86800 THYROGLOBULIN ANTIBODY: CPT | Performed by: FAMILY MEDICINE

## 2025-01-28 PROCEDURE — 86376 MICROSOMAL ANTIBODY EACH: CPT | Performed by: FAMILY MEDICINE

## 2025-01-28 PROCEDURE — 84439 ASSAY OF FREE THYROXINE: CPT | Performed by: FAMILY MEDICINE

## 2025-01-28 PROCEDURE — 82787 IGG 1 2 3 OR 4 EACH: CPT | Performed by: FAMILY MEDICINE

## 2025-01-28 PROCEDURE — 84480 ASSAY TRIIODOTHYRONINE (T3): CPT | Performed by: FAMILY MEDICINE

## 2025-01-29 LAB
T3 SERPL-MCNC: 1.07 NG/ML (ref 0.6–1.81)
THYROGLOB SERPL-MCNC: 17 U/ML (ref ?–60)
THYROPEROXIDASE AB SERPL-ACNC: 833 U/ML (ref ?–60)

## 2025-01-30 NOTE — TELEPHONE ENCOUNTER
I am willing to see him but I do not think there is any availability, I do not know if I can fit him into the schedule since I am only working the 2 days he may want to consider another primary care provider who  is working full-time.  I know [facility] is struggling with getting my regular patients in and I have a long waiting list.  This situation is something that could be addressed with .  You can look at my schedule to see if there is any availability but from my impression there was none in the next couple of months.    In regards to the Hashimoto's a referral to an endocrinologist can be done in the meantime if he would like.

## 2025-01-30 NOTE — TELEPHONE ENCOUNTER
He has been seeing Dr. Montez for these issues and I have not been up-to-date with the issues.  Is he going to reschedule with Dr. Montez to discuss these questions and concerns?  I will not be able to get him into my schedule anytime soon due to only working 2 days

## 2025-01-30 NOTE — PROGRESS NOTES
Thyroid function is normal,  the thyroid peroxidase antibody is elevated consistent with Hashimoto's.  No signs of thyroid dysfunction at this point since the TSH, T4 free and T3 total are normal no medication is needed  repeat thyroid testing yearly.

## 2025-01-31 ENCOUNTER — PATIENT MESSAGE (OUTPATIENT)
Facility: CLINIC | Age: 40
End: 2025-01-31

## 2025-02-03 LAB
IGG SUBCLASS 1: 399 MG/DL
IGG SUBCLASS 2: 398 MG/DL
IGG SUBCLASS 3: 68 MG/DL
IGG SUBCLASS 4: 94 MG/DL

## 2025-02-03 NOTE — TELEPHONE ENCOUNTER
Endo staff, please inform pt the diagnosis of Hashimoto's is given when someone has both positive antithyroid antibodies AND biochemical evidence of subclinical/teresa hypothyroidism (at least elevation of TSH showing destruction of the thyroid). As he is biochemically euthryoid and his thyroid function appears excellent, no treatment is required and I would typically check his TSH/FT4 once a year (he can see me for this if he likes but this is typically done with PCP until/unless labs become abnormal). We do not recommend repeating antibody levels over time as they will always be positive and the titer does not correlate to disease activity or predicting when thyroid destruction will occur.     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    (Karmen, just an FYI - thank you!)

## 2025-02-17 ENCOUNTER — MED REC SCAN ONLY (OUTPATIENT)
Dept: FAMILY MEDICINE CLINIC | Facility: CLINIC | Age: 40
End: 2025-02-17

## 2025-05-11 ENCOUNTER — HOSPITAL ENCOUNTER (EMERGENCY)
Age: 40
Discharge: HOME | End: 2025-05-11
Payer: MEDICAID

## 2025-05-11 VITALS
WEIGHT: 155 LBS | SYSTOLIC BLOOD PRESSURE: 113 MMHG | RESPIRATION RATE: 18 BRPM | HEART RATE: 68 BPM | DIASTOLIC BLOOD PRESSURE: 78 MMHG | HEIGHT: 71 IN | OXYGEN SATURATION: 99 % | TEMPERATURE: 98 F | BODY MASS INDEX: 21.7 KG/M2

## 2025-05-11 DIAGNOSIS — L25.5 PLANT DERMATITIS: Primary | ICD-10-CM

## 2025-05-11 PROCEDURE — 99283 EMERGENCY DEPT VISIT LOW MDM: CPT

## 2025-05-11 RX ORDER — CARBAMAZEPINE 200 MG/1
200 TABLET, EXTENDED RELEASE ORAL 2 TIMES DAILY
COMMUNITY

## 2025-05-11 RX ORDER — PREDNISONE 20 MG/1
40 TABLET ORAL DAILY
Qty: 10 TABLET | Refills: 0 | Status: SHIPPED | OUTPATIENT
Start: 2025-05-11 | End: 2025-05-16

## 2025-05-11 NOTE — ED PROVIDER NOTES
Patient Seen in: [facility] In [place]      History     Chief Complaint   Patient presents with    Rash Skin Problem     Stated Complaint: rashes to bilateral legs and arms  1 week    Subjective:   HPI    Pleasant 40-year-old male.  Patient arrives for evaluation of a itchy rash noted to his bilateral upper extremities, ankles and nape.  Symptomatic for the past week.  Is a .  Has walked through overgrown yards recently.  Otherwise feels well.  History of Present Illness               Objective:     Past Medical History:    Anesthesia complication    Extreme hiccups    Anxiety    Cancer (HCC)    Esophageal reflux    Mitral valve prolapse    Myocardial bridge (HCC)    OTHER DISEASES    right pulmonary nodule - was found to be benign     OTHER DISEASES    HX childhood pneumonia    Seizure disorder (HCC)    In childhood a couple seizures; around 9 years of age undetermined cause              Past Surgical History:   Procedure Laterality Date    Angiogram      2017 - found to have myocardial bridge     Other surgical history  June 2010 [facility]    lung BX     Shoulder arthroscopy                  Social History     Socioeconomic History    Marital status:    Tobacco Use    Smoking status: Former     Current packs/day: 0.50     Average packs/day: 0.5 packs/day for 10.0 years (5.0 ttl pk-yrs)     Types: Cigarettes    Smokeless tobacco: Never   Vaping Use    Vaping status: Never Used   Substance and Sexual Activity    Alcohol use: Not Currently    Drug use: Never   Other Topics Concern     Service No    Blood Transfusions No    Caffeine Concern No    Occupational Exposure No    Hobby Hazards No    Sleep Concern Yes    Stress Concern No    Weight Concern No    Special Diet No    Back Care No    Exercise Yes    Bike Helmet No    Seat Belt Yes    Self-Exams No     Social Drivers of Health     Food Insecurity: No Food Insecurity (9/11/2024)    Food Insecurity     Food  Insecurity: Never true   Transportation Needs: No Transportation Needs (9/11/2024)    Transportation Needs     Lack of Transportation: No   Housing Stability: Low Risk  (9/11/2024)    Housing Stability     Housing Instability: No                                Physical Exam     ED Triage Vitals [05/11/25 1603]   /78   Pulse 68   Resp 18   Temp 98.4 °F (36.9 °C)   Temp src Oral   SpO2 99 %   O2 Device None (Room air)       Current Vitals:   Vital Signs  BP: 113/78  Pulse: 68  Resp: 18  Temp: 98.4 °F (36.9 °C)  Temp src: Oral    Oxygen Therapy  SpO2: 99 %  O2 Device: None (Room air)        Physical Exam     Physical Exam         Gen: Well appearing, well groomed, alert and aware x 3  Lung: No distress, RR, no retraction  Extremities: Full ROM, no deformity, NVI  Skin: Most notable to the bilateral forearms there is an erythematous papular eruption.  Streaking pattern.  Some lesions are postinflammatory.  No vesicles.  Similar eruption to the nape and bilateral ankles  Neuro:  Normal Gait      ED Course   Labs Reviewed - No data to display       Results                       MDM      Skin: Most notable to the bilateral forearms there is an erythematous papular eruption.  Streaking pattern.  Some lesions are postinflammatory.  No vesicles.  Similar eruption to the nape and bilateral ankles    Exam highly suggestive of plant based contact dermatitis    Wash all bedding, clothing and towels.  Calamine lotion.  Over-the-counter antihistamines.  Take steroid as written    Medical Decision Making      Disposition and Plan     Clinical Impression:  1. Plant dermatitis         Disposition:  Discharge  5/11/2025  4:19 pm    Follow-up:  Halley Montez DO  [address] [address] [address] [phone]    Follow up            Medications Prescribed:  Current Discharge Medication List        START taking these medications    Details   predniSONE 20 MG Oral Tab Take 2 tablets (40 mg total) by mouth daily for 5  days.  Qty: 10 tablet, Refills: 0             Supplementary Documentation:

## 2025-05-11 NOTE — DISCHARGE INSTRUCTIONS
Wash all bedding, clothing and towels.  Calamine lotion.  Over-the-counter antihistamines.  Take steroid as written

## 2025-06-03 ENCOUNTER — HOSPITAL ENCOUNTER (EMERGENCY)
Age: 40
Discharge: HOME | End: 2025-06-03
Attending: EMERGENCY MEDICINE

## 2025-06-03 ENCOUNTER — APPOINTMENT (OUTPATIENT)
Dept: GENERAL RADIOLOGY | Age: 40
End: 2025-06-03

## 2025-06-03 VITALS
HEIGHT: 71 IN | BODY MASS INDEX: 21 KG/M2 | WEIGHT: 150 LBS | OXYGEN SATURATION: 100 % | DIASTOLIC BLOOD PRESSURE: 70 MMHG | HEART RATE: 66 BPM | SYSTOLIC BLOOD PRESSURE: 97 MMHG | TEMPERATURE: 98 F | RESPIRATION RATE: 16 BRPM

## 2025-06-03 DIAGNOSIS — S60.221A CONTUSION OF RIGHT HAND, INITIAL ENCOUNTER: Primary | ICD-10-CM

## 2025-06-03 PROCEDURE — 99283 EMERGENCY DEPT VISIT LOW MDM: CPT

## 2025-06-03 PROCEDURE — 73130 X-RAY EXAM OF HAND: CPT | Performed by: EMERGENCY MEDICINE

## 2025-06-03 PROCEDURE — 99284 EMERGENCY DEPT VISIT MOD MDM: CPT

## 2025-06-03 NOTE — DISCHARGE INSTRUCTIONS
Obtain a metacarpal splint from [employer], wear it during the daytime for the next 2 to 3 weeks.    You may take Advil and Tylenol for pain as needed.

## 2025-06-03 NOTE — ED PROVIDER NOTES
Patient Seen in: [facility] In [place]        History  Chief Complaint   Patient presents with    Arm or Hand Injury     Stated Complaint: right hand injjry at work a couple weeks ago    Subjective:   HPI            2 weeks ago the patient his right hand the shelf and since then he has had pain on the knuckles of the 2nd, 3rd and 4th digits on the right.  Has not improved so he came in for evaluation today.          Objective:     Past Medical History:    Anesthesia complication    Extreme hiccups    Anxiety    Cancer (HCC)    Esophageal reflux    Mitral valve prolapse    Myocardial bridge (HCC)    OTHER DISEASES    right pulmonary nodule - was found to be benign     OTHER DISEASES    HX childhood pneumonia    Seizure disorder (HCC)    In childhood a couple seizures; around 9 years of age undetermined cause              Past Surgical History:   Procedure Laterality Date    Angiogram      2017 - found to have myocardial bridge     Other surgical history  June 2010 [facility]    lung BX     Shoulder arthroscopy                  Social History     Socioeconomic History    Marital status:    Tobacco Use    Smoking status: Former     Current packs/day: 0.50     Average packs/day: 0.5 packs/day for 10.0 years (5.0 ttl pk-yrs)     Types: Cigarettes    Smokeless tobacco: Never   Vaping Use    Vaping status: Never Used   Substance and Sexual Activity    Alcohol use: Not Currently    Drug use: Never   Other Topics Concern     Service No    Blood Transfusions No    Caffeine Concern No    Occupational Exposure No    Hobby Hazards No    Sleep Concern Yes    Stress Concern No    Weight Concern No    Special Diet No    Back Care No    Exercise Yes    Bike Helmet No    Seat Belt Yes    Self-Exams No     Social Drivers of Health     Food Insecurity: No Food Insecurity (9/11/2024)    Food Insecurity     Food Insecurity: Never true   Transportation Needs: No Transportation Needs (9/11/2024)     Transportation Needs     Lack of Transportation: No   Housing Stability: Low Risk  (9/11/2024)    Housing Stability     Housing Instability: No                                Physical Exam    ED Triage Vitals [06/03/25 0726]   /68   Pulse 65   Resp 18   Temp 97.9 °F (36.6 °C)   Temp src Temporal   SpO2 100 %   O2 Device        Current Vitals:   Vital Signs  BP: 97/70  Pulse: 66  Resp: 16  Temp: 97.9 °F (36.6 °C)  Temp src: Temporal    Oxygen Therapy  SpO2: 100 %              Physical Exam    Physical Exam   Constitutional: Awake, alert, well appearing  Head: Normocephalic and atraumatic.   Eyes: Conjunctivae are normal. Pupils are equal, round, and reactive to light.   Neck: Normal range of motion. No JVD  Cardiovascular: Normal rate, regular rhythm  Pulmonary/Chest: Normal effort.  No accessory muscle use.  No cyanosis.  Abdominal: Soft. Not distended.  Neurological: Pt is alert and oriented to person, place, and time. no cranial nerve deficits. Speech fluent      Tattoos throughout hand    Bony tenderness of the knuckles on the 3rd and 4th digits  No obvious swelling or erythema.  To me    No other tenderness to the bones of the hand.  No pain with axial loading of any of the digits no broken skin          ED Course  Labs Reviewed - No data to display         XR HAND (MIN 3 VIEWS), RIGHT (CPT=73130)  Result Date: 6/3/2025  CONCLUSION:  No acute fracture or dislocation.   LOCATION:  [facility]   Dictated by (CST): Gabriella Ayala MD on 6/03/2025 at 8:47 AM     Finalized by (CST): Gabriella Ayala MD on 6/03/2025 at 8:48 AM                         MDM           Differential diagnoses considered: Metacarpal fracture, hand contusion, ligament sprain  - X-rays negative for fracture 2 weeks later, doubt occult fracture at this point    -Suspect deep contusion or less like ligamentous injury  - Metacarpal splint for 2 to 3 weeks outpatient hand follow-up      I visualized the radiology studies, my independent  interpretation: No displaced fracture noted on x-ray    *Discussion of ongoing management of this patient's care included: n/a  *Comorbidities contributing to the complexity of decision making: n/a  *External charts reviewed: n/a  *Additional sources of history: n/a    Shared decision making was done by: patient, myself.          Medical Decision Making      Disposition and Plan     Clinical Impression:  1. Contusion of right hand, initial encounter         Disposition:  Discharge  6/3/2025  8:52 am    Follow-up:  Abhi Martinez MD  [address] [place] [id number]  [phone]    Follow up            Medications Prescribed:  Discharge Medication List as of 6/3/2025  8:53 AM                Supplementary Documentation:

## 2025-06-30 ENCOUNTER — HOSPITAL ENCOUNTER (EMERGENCY)
Age: 40
Discharge: LEFT BEFORE DISCHARGE | End: 2025-06-30

## (undated) DEVICE — MICROLARYNGEAL ORAL/NASAL TRACHEAL TUBE CUFFED,MURPHY EYE: Brand: SHILEY

## (undated) DEVICE — SOLUTION IRRIG 1000ML 0.9% NACL USP BTL

## (undated) DEVICE — SLEEVE COMPR MD KNEE LEN SGL USE KENDALL SCD

## (undated) DEVICE — LARYNGOSCOPY: Brand: MEDLINE INDUSTRIES, INC.

## (undated) DEVICE — GLOVE SUR 7.5 SENSICARE PI PIP CRM PWD F

## (undated) DEVICE — YANKAUER,BULB TIP,W/O VENT,RIGID,STERILE: Brand: MEDLINE

## (undated) NOTE — ED AVS SNAPSHOT
Nasrin Peña   MRN: [id number]    Department:  [facility] Emergency Department   Date of Visit:  9/4/2019           Disclosure     Insurance plans vary and the physician(s) referred by the ER may not be covered by your plan.  Please contact your i tell this physician (or your personal doctor if your instructions are to return to your personal doctor) about any new or lasting problems. The primary care or specialist physician will see patients referred from the [facility] Emergency Department.  Maricel Harper

## (undated) NOTE — LETTER
Date & Time: 4/28/2020, 4:11 PM  Patient: Myrna Hopson  Encounter Provider(s):    MD Louie Calhoun APRN       To Whom It May Concern:    Myrna Hopson was seen and treated in our department on 4/28/2020.  He may return to work onc

## (undated) NOTE — ED AVS SNAPSHOT
Ana Crocker   MRN: [id number]    Department:  [facility] Emergency Department   Date of Visit:  8/22/2019           Disclosure     Insurance plans vary and the physician(s) referred by the ER may not be covered by your plan.  Please contact your tell this physician (or your personal doctor if your instructions are to return to your personal doctor) about any new or lasting problems. The primary care or specialist physician will see patients referred from the [facility] Emergency Department.  King Irizarry

## (undated) NOTE — ED AVS SNAPSHOT
Adrian Sánchez   MRN: [id number]    Department:  [facility] Emergency Department   Date of Visit:  7/27/2019           Disclosure     Insurance plans vary and the physician(s) referred by the ER may not be covered by your plan.  Please contact your tell this physician (or your personal doctor if your instructions are to return to your personal doctor) about any new or lasting problems. The primary care or specialist physician will see patients referred from the [facility] Emergency Department.  Salvadore Skiff

## (undated) NOTE — LETTER
1/24/2025    Dwight Cardenas Jr.  [address] [facility] [address]    Dear Dwight,    We would like to inform you that your account has been charged $40 for not showing up to the office for your scheduled appointment on 1/24/25, 7/22/24.    Our no-show policy is as follows: A 24-hour notice is required, or you may be charged a $40 No Show fee.      If you are unable to keep your scheduled appointment, please notify us at least 24 hours in advance so we can accommodate our other patients. You may also reschedule your appointment at that time.    On the third no-show, within a 12-month period, it will be the physician’s discretion as to whether a discharge letter will be sent out disengaging you from the practice and giving you 30 days to enroll with a new non [facility] physician.    If you would like to contest this charge, please call [phone].    Sincerely,  [facility]

## (undated) NOTE — LETTER
Date & Time: 11/21/2021, 9:08 PM  Patient: Noel Galarza  Encounter Provider(s):    Felix Camacho PA-C       To Whom It May Concern:    Noel Galarza was seen and treated in our department on 11/21/2021.   COVID-19 precaution; quarantine for the next

## (undated) NOTE — LETTER
Date & Time: 12/4/2023, 7:47 PM  Patient: Christy March  Encounter Provider(s):    Nalini Omer PA-C       To Whom It May Concern:    Christy March was seen and treated in our department on 12/4/2023. He should not return to work until 12/8/2023 .     If you have any questions or concerns, please do not hesitate to call.        _____________________________  Physician/APC Signature

## (undated) NOTE — LETTER
Date & Time: 3/13/2023, 10:46 AM  Patient: Medardo Campos  Encounter Provider(s): Blanca Devine MD       To Whom It May Concern:    Medardo Campos was seen and treated in our department on 3/13/2023. He should not return to work until 3/16/23.     If you have any questions or concerns, please do not hesitate to call.        _____________________________  Physician/APC Signature

## (undated) NOTE — LETTER
Date & Time: 9/4/2019, 4:25 PM  Patient: Myrna Hopson  Encounter Provider(s):    MD Cj Villavicencio [place]       To Whom It May Concern:    Myrna Hopson was seen and treated in our department on 9/4/2019.  He should not return to work until

## (undated) NOTE — LETTER
Date & Time: 7/1/2022, 8:20 PM  Patient: Chavo Veloz  Encounter Provider(s):    Lizett Owens MD       To Whom It May Concern:    Chavo Veloz was seen and treated in our department on 7/1/2022. He should not return to work until 07/04/2022.     If you have any questions or concerns, please do not hesitate to call.        _____________________________  Physician/APC Signature

## (undated) NOTE — LETTER
Date & Time: 3/28/2022, 6:50 PM  Patient: Cruz Knife  Encounter Provider(s):    Maria Isabel Castaneda MD       To Whom It May Concern:    Nancy Knife was seen and treated in our department on 3/28/2022. He can return to work on 4/1.     If you have any questions or concerns, please do not hesitate to call.        _____________________________  Physician/APC Signature